# Patient Record
Sex: FEMALE | Race: OTHER | HISPANIC OR LATINO | Employment: OTHER | URBAN - METROPOLITAN AREA
[De-identification: names, ages, dates, MRNs, and addresses within clinical notes are randomized per-mention and may not be internally consistent; named-entity substitution may affect disease eponyms.]

---

## 2022-11-10 ENCOUNTER — OFFICE VISIT (OUTPATIENT)
Dept: INTERNAL MEDICINE CLINIC | Facility: CLINIC | Age: 77
End: 2022-11-10

## 2022-11-10 VITALS
HEART RATE: 63 BPM | TEMPERATURE: 98.2 F | OXYGEN SATURATION: 98 % | BODY MASS INDEX: 26 KG/M2 | WEIGHT: 129 LBS | DIASTOLIC BLOOD PRESSURE: 80 MMHG | SYSTOLIC BLOOD PRESSURE: 122 MMHG | HEIGHT: 59 IN

## 2022-11-10 DIAGNOSIS — E03.9 ACQUIRED HYPOTHYROIDISM: ICD-10-CM

## 2022-11-10 DIAGNOSIS — M12.812 ROTATOR CUFF ARTHROPATHY OF BOTH SHOULDERS: Primary | ICD-10-CM

## 2022-11-10 DIAGNOSIS — M12.811 ROTATOR CUFF ARTHROPATHY OF BOTH SHOULDERS: Primary | ICD-10-CM

## 2022-11-10 DIAGNOSIS — E78.2 MIXED HYPERLIPIDEMIA: ICD-10-CM

## 2022-11-10 DIAGNOSIS — I10 BENIGN HYPERTENSION: ICD-10-CM

## 2022-11-10 DIAGNOSIS — R41.3 MEMORY LOSS: ICD-10-CM

## 2022-11-10 RX ORDER — MELOXICAM 7.5 MG/1
7.5 TABLET ORAL DAILY
Qty: 30 TABLET | Refills: 1 | Status: SHIPPED | OUTPATIENT
Start: 2022-11-10

## 2022-11-10 RX ORDER — ATORVASTATIN CALCIUM 20 MG/1
20 TABLET, FILM COATED ORAL DAILY
COMMUNITY

## 2022-11-10 RX ORDER — IRBESARTAN 150 MG/1
150 TABLET ORAL
COMMUNITY

## 2022-11-10 RX ORDER — DICYCLOMINE HCL 20 MG
20 TABLET ORAL 2 TIMES DAILY PRN
COMMUNITY

## 2022-11-10 RX ORDER — LEVOTHYROXINE SODIUM 0.1 MG/1
100 TABLET ORAL DAILY
COMMUNITY

## 2022-11-10 RX ORDER — DONEPEZIL HYDROCHLORIDE 5 MG/1
5 TABLET, FILM COATED ORAL
COMMUNITY

## 2022-11-10 NOTE — PROGRESS NOTES
Assessment/Plan:    BMI Counseling: Body mass index is 26 05 kg/m²  The BMI is above normal  Nutrition recommendations include decreasing portion sizes, encouraging healthy choices of fruits and vegetables and decreasing fast food intake  Exercise recommendations include moderate physical activity 150 minutes/week  Rationale for BMI follow-up plan is due to patient being overweight or obese  Depression Screening and Follow-up Plan: Patient was screened for depression during today's encounter  They screened negative with a PHQ-2 score of 2             1  Rotator cuff arthropathy of both shoulders  Comments:  Exercise for shoulders discussed with her  Orders:  -     meloxicam (Mobic) 7 5 mg tablet; Take 1 tablet (7 5 mg total) by mouth daily    2  Benign hypertension    3  Mixed hyperlipidemia    4  Acquired hypothyroidism    5  Memory loss  -     Ambulatory Referral to Neurology; Future         Subjective:      Patient ID: Ezequiel Badillo is a 78 y o  female  New patient, recently moved here, family member-daughter in law with her, bilateral shoulder pain, right more than left, no trauma, memory issue as per family member, her short-term memory is good, it is a long term memory which is affected more as per family member      The following portions of the patient's history were reviewed and updated as appropriate: She  has a past medical history of Anxiety, Arthritis, Dementia ([address] ), Depression, Disease of thyroid gland, Diverticulitis of colon, Hypertension, and Urinary incontinence  She   Patient Active Problem List    Diagnosis Date Noted   • Rotator cuff arthropathy of both shoulders 11/10/2022   • Benign hypertension 11/10/2022   • Mixed hyperlipidemia 11/10/2022   • Acquired hypothyroidism 11/10/2022   • Memory loss 11/10/2022     She  has a past surgical history that includes Breast surgery; Colon surgery; and Knee surgery  Her family history includes Dementia in her mother    She  reports that she has never smoked  She has never used smokeless tobacco  She reports that she does not drink alcohol  No history on file for drug use  Current Outpatient Medications   Medication Sig Dispense Refill   • atorvastatin (LIPITOR) 20 mg tablet Take 20 mg by mouth daily     • dicyclomine (BENTYL) 20 mg tablet Take 20 mg by mouth 2 (two) times a day as needed     • donepezil (ARICEPT) 5 mg tablet Take 5 mg by mouth daily at bedtime     • irbesartan (AVAPRO) 150 mg tablet Take 150 mg by mouth daily at bedtime     • levothyroxine 100 mcg tablet Take 100 mcg by mouth daily     • meloxicam (Mobic) 7 5 mg tablet Take 1 tablet (7 5 mg total) by mouth daily 30 tablet 1   • sertraline (ZOLOFT) 50 mg tablet Take 50 mg by mouth daily (Patient not taking: Reported on 11/10/2022)       No current facility-administered medications for this visit  Current Outpatient Medications on File Prior to Visit   Medication Sig   • atorvastatin (LIPITOR) 20 mg tablet Take 20 mg by mouth daily   • dicyclomine (BENTYL) 20 mg tablet Take 20 mg by mouth 2 (two) times a day as needed   • donepezil (ARICEPT) 5 mg tablet Take 5 mg by mouth daily at bedtime   • irbesartan (AVAPRO) 150 mg tablet Take 150 mg by mouth daily at bedtime   • levothyroxine 100 mcg tablet Take 100 mcg by mouth daily   • sertraline (ZOLOFT) 50 mg tablet Take 50 mg by mouth daily (Patient not taking: Reported on 11/10/2022)     No current facility-administered medications on file prior to visit  She has No Known Allergies       Review of Systems   Constitutional: Negative for chills and fever  HENT: Negative for congestion, ear pain and sore throat  Eyes: Negative for pain  Respiratory: Negative for cough and shortness of breath  Cardiovascular: Negative for chest pain and leg swelling  Gastrointestinal: Negative for abdominal pain, nausea and vomiting  Endocrine: Negative for polyuria  Genitourinary: Negative for difficulty urinating, frequency and urgency  Musculoskeletal: Positive for arthralgias  Negative for back pain  Skin: Negative for rash  Neurological: Negative for weakness and headaches  Psychiatric/Behavioral: Negative for sleep disturbance  The patient is not nervous/anxious  Objective:      /80 (BP Location: Right arm, Patient Position: Sitting, Cuff Size: Standard)   Pulse 63   Temp 98 2 °F (36 8 °C) (Tympanic)   Ht 4' 11" (1 499 m)   Wt 58 5 kg (129 lb)   SpO2 98%   BMI 26 05 kg/m²     No results found for this or any previous visit (from the past 1344 hour(s))  Physical Exam  Constitutional:       Appearance: Normal appearance  HENT:      Head: Normocephalic  Right Ear: Tympanic membrane, ear canal and external ear normal       Left Ear: Tympanic membrane, ear canal and external ear normal       Nose: Nose normal  No congestion  Mouth/Throat:      Mouth: Mucous membranes are moist       Pharynx: Oropharynx is clear  No oropharyngeal exudate or posterior oropharyngeal erythema  Eyes:      Extraocular Movements: Extraocular movements intact  Conjunctiva/sclera: Conjunctivae normal       Pupils: Pupils are equal, round, and reactive to light  Cardiovascular:      Rate and Rhythm: Normal rate and regular rhythm  Heart sounds: Normal heart sounds  No murmur heard  Pulmonary:      Effort: Pulmonary effort is normal       Breath sounds: Normal breath sounds  No wheezing or rales  Abdominal:      General: Bowel sounds are normal  There is no distension  Palpations: Abdomen is soft  Tenderness: There is no abdominal tenderness  Musculoskeletal:      Cervical back: Normal range of motion and neck supple  Right lower leg: No edema  Left lower leg: No edema  Comments: Bilateral shoulder exam-movement painful and restricted due to pain, minimal tenderness present at the lateral side of the shoulders   Lymphadenopathy:      Cervical: No cervical adenopathy     Skin:     General: Skin is warm  Neurological:      General: No focal deficit present  Mental Status: She is alert and oriented to person, place, and time

## 2022-12-05 ENCOUNTER — TELEPHONE (OUTPATIENT)
Dept: NEUROLOGY | Facility: CLINIC | Age: 77
End: 2022-12-05

## 2022-12-19 ENCOUNTER — OFFICE VISIT (OUTPATIENT)
Dept: INTERNAL MEDICINE CLINIC | Facility: CLINIC | Age: 77
End: 2022-12-19

## 2022-12-19 VITALS
DIASTOLIC BLOOD PRESSURE: 80 MMHG | WEIGHT: 132 LBS | TEMPERATURE: 98.1 F | HEART RATE: 70 BPM | SYSTOLIC BLOOD PRESSURE: 132 MMHG | OXYGEN SATURATION: 98 % | HEIGHT: 59 IN | BODY MASS INDEX: 26.61 KG/M2

## 2022-12-19 DIAGNOSIS — E03.9 ACQUIRED HYPOTHYROIDISM: ICD-10-CM

## 2022-12-19 DIAGNOSIS — F34.1 DYSTHYMIC DISORDER: ICD-10-CM

## 2022-12-19 DIAGNOSIS — M12.812 ROTATOR CUFF ARTHROPATHY OF BOTH SHOULDERS: ICD-10-CM

## 2022-12-19 DIAGNOSIS — I10 BENIGN HYPERTENSION: ICD-10-CM

## 2022-12-19 DIAGNOSIS — M12.811 ROTATOR CUFF ARTHROPATHY OF BOTH SHOULDERS: ICD-10-CM

## 2022-12-19 DIAGNOSIS — E78.2 MIXED HYPERLIPIDEMIA: ICD-10-CM

## 2022-12-19 DIAGNOSIS — R41.3 MEMORY LOSS: Primary | ICD-10-CM

## 2022-12-19 DIAGNOSIS — Z12.11 SPECIAL SCREENING FOR MALIGNANT NEOPLASMS, COLON: ICD-10-CM

## 2022-12-19 RX ORDER — MIRTAZAPINE 15 MG/1
15 TABLET, FILM COATED ORAL
Qty: 30 TABLET | Refills: 1 | Status: SHIPPED | OUTPATIENT
Start: 2022-12-19

## 2022-12-19 NOTE — PROGRESS NOTES
Assessment/Plan:             1  Memory loss    2  Mixed hyperlipidemia    3  Benign hypertension    4  Acquired hypothyroidism    5  Rotator cuff arthropathy of both shoulders    6  Dysthymic disorder  -     mirtazapine (REMERON) 15 mg tablet; Take 1 tablet (15 mg total) by mouth daily at bedtime    7  Special screening for malignant neoplasms, colon  -     Ambulatory referral for colonoscopy; Future         Subjective:      Patient ID: Henok Lal is a 78 y o  female  Follow-up on shoulder pain, doing well, also follow-up on other medical issues to ensure stable on current medications      The following portions of the patient's history were reviewed and updated as appropriate: She  has a past medical history of Anxiety, Arthritis, Dementia ([address] ), Depression, Disease of thyroid gland, Diverticulitis of colon, Hypertension, and Urinary incontinence  She   Patient Active Problem List    Diagnosis Date Noted   • Rotator cuff arthropathy of both shoulders 11/10/2022   • Benign hypertension 11/10/2022   • Mixed hyperlipidemia 11/10/2022   • Acquired hypothyroidism 11/10/2022   • Memory loss 11/10/2022     She  has a past surgical history that includes Breast surgery; Colon surgery; and Knee surgery  Her family history includes Dementia in her mother  She  reports that she has never smoked  She has never used smokeless tobacco  She reports that she does not drink alcohol and does not use drugs    Current Outpatient Medications   Medication Sig Dispense Refill   • atorvastatin (LIPITOR) 20 mg tablet Take 20 mg by mouth daily     • dicyclomine (BENTYL) 20 mg tablet Take 20 mg by mouth 2 (two) times a day as needed     • donepezil (ARICEPT) 5 mg tablet Take 5 mg by mouth daily at bedtime     • irbesartan (AVAPRO) 150 mg tablet Take 150 mg by mouth daily at bedtime     • levothyroxine 100 mcg tablet Take 100 mcg by mouth daily     • meloxicam (Mobic) 7 5 mg tablet Take 1 tablet (7 5 mg total) by mouth daily 30 tablet 1 • mirtazapine (REMERON) 15 mg tablet Take 1 tablet (15 mg total) by mouth daily at bedtime 30 tablet 1     No current facility-administered medications for this visit  Current Outpatient Medications on File Prior to Visit   Medication Sig   • atorvastatin (LIPITOR) 20 mg tablet Take 20 mg by mouth daily   • dicyclomine (BENTYL) 20 mg tablet Take 20 mg by mouth 2 (two) times a day as needed   • donepezil (ARICEPT) 5 mg tablet Take 5 mg by mouth daily at bedtime   • irbesartan (AVAPRO) 150 mg tablet Take 150 mg by mouth daily at bedtime   • levothyroxine 100 mcg tablet Take 100 mcg by mouth daily   • meloxicam (Mobic) 7 5 mg tablet Take 1 tablet (7 5 mg total) by mouth daily   • [DISCONTINUED] sertraline (ZOLOFT) 50 mg tablet Take 50 mg by mouth daily (Patient not taking: Reported on 11/10/2022)     No current facility-administered medications on file prior to visit  She has No Known Allergies       Review of Systems   Constitutional: Negative for chills and fever  HENT: Negative for congestion, ear pain and sore throat  Eyes: Negative for pain  Respiratory: Negative for cough and shortness of breath  Cardiovascular: Negative for chest pain and leg swelling  Gastrointestinal: Negative for abdominal pain, nausea and vomiting  Endocrine: Negative for polyuria  Genitourinary: Negative for difficulty urinating, frequency and urgency  Musculoskeletal: Positive for arthralgias  Negative for back pain  Skin: Negative for rash  Neurological: Negative for weakness and headaches  Psychiatric/Behavioral: Negative for sleep disturbance  The patient is not nervous/anxious  Objective:      /80 (BP Location: Left arm, Patient Position: Sitting, Cuff Size: Large)   Pulse 70   Temp 98 1 °F (36 7 °C) (Temporal)   Ht 4' 11" (1 499 m)   Wt 59 9 kg (132 lb)   SpO2 98%   BMI 26 66 kg/m²     No results found for this or any previous visit (from the past 1344 hour(s))       Physical Exam  Constitutional:       Appearance: Normal appearance  HENT:      Head: Normocephalic  Right Ear: Tympanic membrane, ear canal and external ear normal       Left Ear: Tympanic membrane, ear canal and external ear normal       Nose: Nose normal  No congestion  Mouth/Throat:      Mouth: Mucous membranes are moist       Pharynx: Oropharynx is clear  No oropharyngeal exudate or posterior oropharyngeal erythema  Eyes:      Extraocular Movements: Extraocular movements intact  Conjunctiva/sclera: Conjunctivae normal       Pupils: Pupils are equal, round, and reactive to light  Cardiovascular:      Rate and Rhythm: Normal rate and regular rhythm  Heart sounds: Normal heart sounds  No murmur heard  Pulmonary:      Effort: Pulmonary effort is normal       Breath sounds: Normal breath sounds  No wheezing or rales  Abdominal:      General: Bowel sounds are normal  There is no distension  Palpations: Abdomen is soft  Tenderness: There is no abdominal tenderness  Musculoskeletal:         General: Normal range of motion  Cervical back: Normal range of motion and neck supple  Right lower leg: No edema  Left lower leg: No edema  Lymphadenopathy:      Cervical: No cervical adenopathy  Skin:     General: Skin is warm  Neurological:      General: No focal deficit present  Mental Status: She is alert and oriented to person, place, and time

## 2023-01-05 ENCOUNTER — TELEPHONE (OUTPATIENT)
Dept: NEUROLOGY | Facility: CLINIC | Age: 78
End: 2023-01-05

## 2023-01-05 NOTE — TELEPHONE ENCOUNTER
THE [facility] to confirm patient's 1/10/2023 at 2 pm with Dr Lorenzo Mcrae at the [facility]  Call back number given [phone]

## 2023-01-09 NOTE — PROGRESS NOTES
Assessment/Plan:    Memory loss        Moderate dementia  Adryan Harvey is a 78 yr old woman with hx of thyroid disease on levothyroxine, hld, depression, and anxiety presenting for for memory complaints  Patient has both short term and long term memory problems  Unclear timing of onset of memory problems as patient previously lived in [place] with daughter which there has been conflict between the patient and daughter  Daughter in law having difficulty finding records from [place]  Daughter in law states that memory is improving while off the sertraline and sleep has improved while on Remeron  Patient has had difficulties remembering where she was while driving in [place] despite being on familiar roads (Patient no longer drives)  Patient seems to have had issues with finances independently and has needed help from family  No previous labs and images available for reference as she has started establishing care few months ago and no records from [place] are available  Patient's aunt and mother may have had Alzheimer's disease according to daughter in law  Initial evaluation in clinic: 01/10/23    Impression: Moderate cognitive decline based on MOCA test  Differential diagnoses include but not limited to Alzheimer's disease, Lewy Body Dementia, Frontotemporal dementia, vascular dementia, pseudodementia  Most likely Alzheimer's disease and no known stroke history but vascular dementia cannot be ruled out as there is a paucity of history from when patient was back in [place]  Lewy Body Dementia less likely as no hallucinations/ Parkinsonian symptom and less likely frontotemporal dementia as behavioral issues are not significant in terms of issues  Also component of pseudodementia due to depression and anxiety but unlikely explain her complete clinical picture      Plan:  - Differential diagnoses and next steps reviewed with the patient  - Imaging recommendations: No imaging recommended at this time and MRI brain Neuroquant  - Labs: TSH, B12/folate  - Referrals: Neuropsych   - Increased Donepezil to 10mg nightly due to moderate dementia  - Patient does not drive  Patient to followup in 6 months for memory evaluation  - Patient and daughter in law agreeable to above           Diagnoses and all orders for this visit:    Memory loss  -     Ambulatory Referral to Neurology  -     MRI brain NeuroQuant wo and w contrast; Future  -     Ambulatory referral to Neuropsychology; Future  -     Vitamin B12/Folate, Serum Panel; Future  -     TSH, 3rd generation with Free T4 reflex; Future  -     donepezil (ARICEPT) 10 mg tablet; Take 1 tablet (10 mg total) by mouth daily at bedtime  -     Basic metabolic panel; Future    Moderate dementia          Subjective:      Patient ID: Adryan Harvey is a 78 y o  female  Patient is a 78year old woman with hx of thyroid disease on levothyroxine, hld, depression and anxiety  Memory issues unclear when first noticed  More long term memory rather than short term memory  Patient when cannot remember she starts hitting her head due to being in distress  She has times she forgot her coat  She forgets keep things secret and then when she was told that, she becomes distressed  Memory problem first noticed by patient but she is unsure when memory problems started  Daughter in law suspects possibly in a couple years  Memory loss seems gradual but there is paucity of information due to living with daughter in the past  Patient has difficulty with memory from the past like where she lived in the past and her family information from the past such as dates and when she moved  Memory has been improving according to daughter in law especially after primary doctor removed the sertraline 50mg off of the medication list  Patient is fluent in speech with no word finding difficulties  She does not drive but when she was in [place] she drove but had times when she was on a familiar road she did not know where she is  She does have one time when she did not where she was and in [place] she did have 1-2 times when she was lost in her house  She had a time when she gave personal information to an unknown person online but she then realized what she did and then become emotionally distressed but unsure if patient makes stories up as well  Family assists for her financial capabilities, because she has episodes that seems like patient is confabulating for attention  According to daughter in law, patient does have emotional instability due to missing friends and family from [place] and is now in a new location  She doesn't need a walker or cane and have had no falls  No confusion, no seizures, no staring spells  Patient sleeping well after placing patient on remeron for sleep  Patient has polyuria which has been controlled by her medicine doctor and can control when she needs to urinate  Denies hallucinations  Mother and aunt may have had Alzheimer's in the past     Also states having shaking of both hands when she's anxious or when talking to her daughter and does not have these shaking otherwise  No pacing and no agitated behavior  No suspicious behaviors, feeling withdrawn  She has some numbness of the hands bilaterally intermittently which are occasional and no weakness  No vision problems  Patient says she has pain on back of the head which resolves with massaging of the head  The following portions of the patient's history were reviewed and updated as appropriate:   She  has a past medical history of Anxiety, Arthritis, Dementia ([address] ), Depression, Disease of thyroid gland, Diverticulitis of colon, Hypertension, and Urinary incontinence    She   Patient Active Problem List    Diagnosis Date Noted   • Moderate dementia 01/10/2023   • Rotator cuff arthropathy of both shoulders 11/10/2022   • Benign hypertension 11/10/2022   • Mixed hyperlipidemia 11/10/2022   • Acquired hypothyroidism 11/10/2022   • Memory loss 11/10/2022     She  has a past surgical history that includes Breast surgery; Colon surgery; and Knee surgery  Her family history includes Dementia in her mother  She  reports that she has never smoked  She has never used smokeless tobacco  She reports that she does not drink alcohol and does not use drugs  Current Outpatient Medications   Medication Sig Dispense Refill   • atorvastatin (LIPITOR) 20 mg tablet Take 20 mg by mouth daily     • dicyclomine (BENTYL) 20 mg tablet Take 20 mg by mouth 2 (two) times a day as needed     • donepezil (ARICEPT) 10 mg tablet Take 1 tablet (10 mg total) by mouth daily at bedtime 90 tablet 1   • irbesartan (AVAPRO) 150 mg tablet Take 150 mg by mouth daily at bedtime     • levothyroxine 100 mcg tablet Take 100 mcg by mouth daily     • meloxicam (Mobic) 7 5 mg tablet Take 1 tablet (7 5 mg total) by mouth daily 30 tablet 1   • mirtazapine (REMERON) 15 mg tablet Take 1 tablet (15 mg total) by mouth daily at bedtime 30 tablet 1     No current facility-administered medications for this visit  Current Outpatient Medications on File Prior to Visit   Medication Sig   • atorvastatin (LIPITOR) 20 mg tablet Take 20 mg by mouth daily   • dicyclomine (BENTYL) 20 mg tablet Take 20 mg by mouth 2 (two) times a day as needed   • irbesartan (AVAPRO) 150 mg tablet Take 150 mg by mouth daily at bedtime   • levothyroxine 100 mcg tablet Take 100 mcg by mouth daily   • meloxicam (Mobic) 7 5 mg tablet Take 1 tablet (7 5 mg total) by mouth daily   • mirtazapine (REMERON) 15 mg tablet Take 1 tablet (15 mg total) by mouth daily at bedtime   • [DISCONTINUED] donepezil (ARICEPT) 5 mg tablet Take 5 mg by mouth daily at bedtime     No current facility-administered medications on file prior to visit  She has No Known Allergies       Review of Systems   Constitutional: Negative for fatigue and fever  HENT: Negative for sore throat, trouble swallowing and voice change      Eyes: Negative for photophobia, pain and visual disturbance  Respiratory: Negative for chest tightness and shortness of breath  Cardiovascular: Negative for chest pain  Gastrointestinal: Positive for diarrhea  Negative for constipation  Endocrine: Positive for polyuria  Genitourinary: Negative for difficulty urinating  Musculoskeletal: Negative for back pain and gait problem  Neurological: Negative for speech difficulty, weakness and numbness  Objective:      /80 (BP Location: Right arm, Patient Position: Sitting, Cuff Size: Large)   Pulse 74   Ht 4' 11" (1 499 m)   Wt 61 kg (134 lb 6 4 oz)   BMI 27 15 kg/m²          Physical Exam  Eyes:      Extraocular Movements: EOM normal       Pupils: Pupils are equal, round, and reactive to light  Neurological:      Motor: Motor strength is normal       Coordination: Finger-Nose-Finger Test and Heel to [facility] Test normal       Deep Tendon Reflexes:      Reflex Scores:       Bicep reflexes are 2+ on the right side and 2+ on the left side  Brachioradialis reflexes are 2+ on the right side and 2+ on the left side  Patellar reflexes are 2+ on the right side and 2+ on the left side  Achilles reflexes are 2+ on the right side and 2+ on the left side  Psychiatric:         Speech: Speech normal          Neurologic Exam     Mental Status   Oriented to person  Disoriented to place  Disoriented to date  Oriented to year and month  Attention: decreased  Concentration: decreased  Speech: speech is normal   Level of consciousness: alert  Knowledge: good  [facility] 11/[facility] with particular deficits in recall (0/5), visuospatial, and language  Cranial Nerves     CN II   Visual fields full to confrontation  CN III, IV, VI   Pupils are equal, round, and reactive to light  Extraocular motions are normal    Right pupil: Shape: regular  Reactivity: brisk  Consensual response: intact  Left pupil: Shape: regular  Reactivity: brisk  Consensual response: intact  Nystagmus: none   Diplopia: none    CN V   Facial sensation intact  CN VII   Facial expression full, symmetric  CN IX, X   CN IX normal    CN X normal      CN XI   CN XI normal      Motor Exam   Muscle bulk: normal  Overall muscle tone: normal  Right arm tone: normal  Left arm tone: normal  Right arm pronator drift: absent  Right leg tone: normal  Left leg tone: normal    Strength   Strength 5/5 throughout       Sensory Exam   Light touch normal      Gait, Coordination, and Reflexes     Gait  Gait: wide-based    Coordination   Finger to nose coordination: normal  Heel to shin coordination: normal    Reflexes   Right brachioradialis: 2+  Left brachioradialis: 2+  Right biceps: 2+  Left biceps: 2+  Right patellar: 2+  Left patellar: 2+  Right achilles: 2+  Left achilles: 2+  Right plantar: normal  Left plantar: normal

## 2023-01-10 ENCOUNTER — OFFICE VISIT (OUTPATIENT)
Dept: NEUROLOGY | Facility: CLINIC | Age: 78
End: 2023-01-10

## 2023-01-10 VITALS
WEIGHT: 134.4 LBS | DIASTOLIC BLOOD PRESSURE: 80 MMHG | SYSTOLIC BLOOD PRESSURE: 118 MMHG | HEART RATE: 74 BPM | HEIGHT: 59 IN | BODY MASS INDEX: 27.09 KG/M2

## 2023-01-10 DIAGNOSIS — F03.B0: ICD-10-CM

## 2023-01-10 DIAGNOSIS — R41.3 MEMORY LOSS: Primary | ICD-10-CM

## 2023-01-10 RX ORDER — DONEPEZIL HYDROCHLORIDE 10 MG/1
10 TABLET, FILM COATED ORAL
Qty: 90 TABLET | Refills: 1 | Status: SHIPPED | OUTPATIENT
Start: 2023-01-10 | End: 2023-01-16 | Stop reason: SDUPTHER

## 2023-01-10 NOTE — PATIENT INSTRUCTIONS
Please get MRI brain before next visit    Please get bloodwork before next visit    Please go for Neuropsychology visit for further memory tests    Increase donepezil to 10mg daily    Followup in 6 months

## 2023-01-10 NOTE — ASSESSMENT & PLAN NOTE
Serena Zarate is a 78 yr old woman with hx of thyroid disease on levothyroxine, hld, depression, and anxiety presenting for for memory complaints  Patient has both short term and long term memory problems  Unclear timing of onset of memory problems as patient previously lived in [place] with daughter which there has been conflict between the patient and daughter  Daughter in law having difficulty finding records from [place]  Daughter in law states that memory is improving while off the sertraline and sleep has improved while on Remeron  Patient has had difficulties remembering where she was while driving in [place] despite being on familiar roads (Patient no longer drives)  Patient seems to have had issues with finances independently and has needed help from family  No previous labs and images available for reference as she has started establishing care few months ago and no records from [place] are available  Patient's aunt and mother may have had Alzheimer's disease according to daughter in law  Initial evaluation in clinic: 01/10/23    Impression: Moderate cognitive decline based on MOCA test  Differential diagnoses include but not limited to Alzheimer's disease, Lewy Body Dementia, Frontotemporal dementia, vascular dementia, pseudodementia  Most likely Alzheimer's disease and no known stroke history but vascular dementia cannot be ruled out as there is a paucity of history from when patient was back in [place]  Lewy Body Dementia less likely as no hallucinations/ Parkinsonian symptom and less likely frontotemporal dementia as behavioral issues are not significant in terms of issues  Also component of pseudodementia due to depression and anxiety but unlikely explain her complete clinical picture      Plan:  - Differential diagnoses and next steps reviewed with the patient  - Imaging recommendations: No imaging recommended at this time and MRI brain Neuroquant  - Labs: TSH, B12/folate  - Referrals: Neuropsych - Increased Donepezil to 10mg nightly due to moderate dementia  - Patient does not drive  Patient to followup in 6 months for memory evaluation  - Patient and daughter in law agreeable to above

## 2023-01-16 ENCOUNTER — OFFICE VISIT (OUTPATIENT)
Dept: INTERNAL MEDICINE CLINIC | Facility: CLINIC | Age: 78
End: 2023-01-16

## 2023-01-16 ENCOUNTER — APPOINTMENT (OUTPATIENT)
Dept: LAB | Facility: CLINIC | Age: 78
End: 2023-01-16

## 2023-01-16 VITALS
OXYGEN SATURATION: 98 % | HEART RATE: 58 BPM | WEIGHT: 128 LBS | SYSTOLIC BLOOD PRESSURE: 134 MMHG | BODY MASS INDEX: 25.8 KG/M2 | TEMPERATURE: 97.8 F | HEIGHT: 59 IN | DIASTOLIC BLOOD PRESSURE: 78 MMHG

## 2023-01-16 DIAGNOSIS — M12.811 ROTATOR CUFF ARTHROPATHY OF BOTH SHOULDERS: ICD-10-CM

## 2023-01-16 DIAGNOSIS — M12.812 ROTATOR CUFF ARTHROPATHY OF BOTH SHOULDERS: ICD-10-CM

## 2023-01-16 DIAGNOSIS — E78.2 MIXED HYPERLIPIDEMIA: Primary | ICD-10-CM

## 2023-01-16 DIAGNOSIS — R41.3 MEMORY LOSS: ICD-10-CM

## 2023-01-16 DIAGNOSIS — F02.A0 MILD EARLY ONSET ALZHEIMER'S DEMENTIA WITHOUT BEHAVIORAL DISTURBANCE, PSYCHOTIC DISTURBANCE, MOOD DISTURBANCE, OR ANXIETY (HCC): ICD-10-CM

## 2023-01-16 DIAGNOSIS — Z00.00 MEDICARE ANNUAL WELLNESS VISIT, SUBSEQUENT: ICD-10-CM

## 2023-01-16 DIAGNOSIS — G30.0 MILD EARLY ONSET ALZHEIMER'S DEMENTIA WITHOUT BEHAVIORAL DISTURBANCE, PSYCHOTIC DISTURBANCE, MOOD DISTURBANCE, OR ANXIETY (HCC): ICD-10-CM

## 2023-01-16 DIAGNOSIS — F34.1 DYSTHYMIC DISORDER: ICD-10-CM

## 2023-01-16 DIAGNOSIS — E03.9 ACQUIRED HYPOTHYROIDISM: ICD-10-CM

## 2023-01-16 DIAGNOSIS — I10 BENIGN HYPERTENSION: ICD-10-CM

## 2023-01-16 LAB
ANION GAP SERPL CALCULATED.3IONS-SCNC: 2 MMOL/L (ref 4–13)
BUN SERPL-MCNC: 24 MG/DL (ref 5–25)
CALCIUM SERPL-MCNC: 8.7 MG/DL (ref 8.3–10.1)
CHLORIDE SERPL-SCNC: 107 MMOL/L (ref 96–108)
CO2 SERPL-SCNC: 32 MMOL/L (ref 21–32)
CREAT SERPL-MCNC: 0.96 MG/DL (ref 0.6–1.3)
FOLATE SERPL-MCNC: 16.2 NG/ML (ref 3.1–17.5)
GFR SERPL CREATININE-BSD FRML MDRD: 58 ML/MIN/1.73SQ M
GLUCOSE P FAST SERPL-MCNC: 75 MG/DL (ref 65–99)
POTASSIUM SERPL-SCNC: 4.3 MMOL/L (ref 3.5–5.3)
SODIUM SERPL-SCNC: 141 MMOL/L (ref 135–147)
T4 FREE SERPL-MCNC: 0.8 NG/DL (ref 0.76–1.46)
TSH SERPL DL<=0.05 MIU/L-ACNC: 11 UIU/ML (ref 0.45–4.5)
VIT B12 SERPL-MCNC: 462 PG/ML (ref 100–900)

## 2023-01-16 RX ORDER — LEVOTHYROXINE SODIUM 0.1 MG/1
100 TABLET ORAL DAILY
Qty: 180 TABLET | Refills: 0 | Status: SHIPPED | OUTPATIENT
Start: 2023-01-16 | End: 2023-01-19 | Stop reason: SDUPTHER

## 2023-01-16 RX ORDER — MELOXICAM 7.5 MG/1
7.5 TABLET ORAL DAILY
Qty: 180 TABLET | Refills: 0 | Status: SHIPPED | OUTPATIENT
Start: 2023-01-16 | End: 2023-01-19 | Stop reason: SDUPTHER

## 2023-01-16 RX ORDER — ATORVASTATIN CALCIUM 20 MG/1
20 TABLET, FILM COATED ORAL DAILY
Qty: 180 TABLET | Refills: 0 | Status: SHIPPED | OUTPATIENT
Start: 2023-01-16 | End: 2023-01-19 | Stop reason: SDUPTHER

## 2023-01-16 RX ORDER — MIRTAZAPINE 15 MG/1
15 TABLET, FILM COATED ORAL
Qty: 180 TABLET | Refills: 0 | Status: SHIPPED | OUTPATIENT
Start: 2023-01-16 | End: 2023-01-19 | Stop reason: SDUPTHER

## 2023-01-16 RX ORDER — DONEPEZIL HYDROCHLORIDE 10 MG/1
10 TABLET, FILM COATED ORAL
Qty: 180 TABLET | Refills: 0 | Status: SHIPPED | OUTPATIENT
Start: 2023-01-16 | End: 2023-01-19 | Stop reason: SDUPTHER

## 2023-01-16 RX ORDER — IRBESARTAN 150 MG/1
150 TABLET ORAL
Qty: 180 TABLET | Refills: 0 | Status: SHIPPED | OUTPATIENT
Start: 2023-01-16 | End: 2023-01-19 | Stop reason: SDUPTHER

## 2023-01-16 NOTE — PROGRESS NOTES
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, psychotic disturbance, mood disturbance, or anxiety (HCC)    Relevant Medications    mirtazapine (REMERON) 15 mg tablet    donepezil (ARICEPT) 10 mg tablet       Musculoskeletal and Integument    Rotator cuff arthropathy of both shoulders    Relevant Medications    meloxicam (Mobic) 7 5 mg tablet       Other    Mixed hyperlipidemia - Primary    Relevant Medications    atorvastatin (LIPITOR) 20 mg tablet    Memory loss    Medicare annual wellness visit, subsequent    Dysthymic disorder    Relevant Medications    mirtazapine (REMERON) 15 mg tablet    donepezil (ARICEPT) 10 mg tablet     BMI Counseling: Body mass index is 25 85 kg/m²  The BMI is above normal  Nutrition recommendations include decreasing portion sizes, encouraging healthy choices of fruits and vegetables and decreasing fast food intake  Exercise recommendations include moderate physical activity 150 minutes/week  Rationale for BMI follow-up plan is due to patient being overweight or obese  Preventive health issues were discussed with patient, and age appropriate screening tests were ordered as noted in patient's After Visit Summary  Personalized health advice and appropriate referrals for health education or preventive services given if needed, as noted in patient's After Visit Summary       History of Present Illness:     Patient presents for a Medicare Wellness Visit    Follow-up on multiple medical problems to ensure they are stable on current medication, also Medicare wellness exam     No care team member to display     Review of Systems:     Review of Systems Constitutional: Negative for chills and fever  HENT: Negative for congestion, ear pain and sore throat  Eyes: Negative for pain  Respiratory: Negative for cough and shortness of breath  Cardiovascular: Negative for chest pain and leg swelling  Gastrointestinal: Negative for abdominal pain, nausea and vomiting  Endocrine: Negative for polyuria  Genitourinary: Negative for difficulty urinating, frequency and urgency  Musculoskeletal: Positive for back pain  Negative for arthralgias  Skin: Negative for rash  Neurological: Negative for weakness and headaches  Psychiatric/Behavioral: Negative for sleep disturbance  The patient is not nervous/anxious  Problem List:     Patient Active Problem List   Diagnosis   • Rotator cuff arthropathy of both shoulders   • Benign hypertension   • Mixed hyperlipidemia   • Acquired hypothyroidism   • Memory loss   • Moderate dementia   • Medicare annual wellness visit, subsequent   • Dysthymic disorder   • 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, psychotic disturbance, mood disturbance, or anxiety ([facility] )      Past Medical and Surgical History:     Past Medical History:   Diagnosis Date   • Anxiety    • Arthritis    • Dementia ([facility] )    • Depression    • Disease of thyroid gland    • Diverticulitis of colon    • Hypertension    • Urinary incontinence      Past Surgical History:   Procedure Laterality Date   • BREAST SURGERY     • COLON SURGERY     • KNEE SURGERY        Family History:     Family History   Problem Relation Age of Onset   • Dementia Mother       Social History:     Social History     Socioeconomic History   • Marital status:       Spouse name: None   • Number of children: None   • Years of education: None   • Highest education level: None   Occupational History   • None   Tobacco Use   • Smoking status: Never   • Smokeless tobacco: Never   Vaping Use   • Vaping Use: Never used   Substance and Sexual Activity • Alcohol use: Never   • Drug use: Never   • Sexual activity: Not Currently   Other Topics Concern   • None   Social History Narrative   • None     Social Determinants of Health     Financial Resource Strain: Low Risk    • Difficulty of Paying Living Expenses: Not hard at all   Food Insecurity: Not on file   Transportation Needs: No Transportation Needs   • Lack of Transportation (Medical): No   • Lack of Transportation (Non-Medical): No   Physical Activity: Not on file   Stress: Not on file   Social Connections: Not on file   Intimate Partner Violence: Not on file   Housing Stability: Not on file      Medications and Allergies:     Current Outpatient Medications   Medication Sig Dispense Refill   • atorvastatin (LIPITOR) 20 mg tablet Take 1 tablet (20 mg total) by mouth daily 180 tablet 0   • dicyclomine (BENTYL) 20 mg tablet Take 20 mg by mouth 2 (two) times a day as needed     • donepezil (ARICEPT) 10 mg tablet Take 1 tablet (10 mg total) by mouth daily at bedtime 180 tablet 0   • irbesartan (AVAPRO) 150 mg tablet Take 1 tablet (150 mg total) by mouth daily at bedtime 180 tablet 0   • levothyroxine 100 mcg tablet Take 1 tablet (100 mcg total) by mouth daily 180 tablet 0   • meloxicam (Mobic) 7 5 mg tablet Take 1 tablet (7 5 mg total) by mouth daily 180 tablet 0   • mirtazapine (REMERON) 15 mg tablet Take 1 tablet (15 mg total) by mouth daily at bedtime 180 tablet 0     No current facility-administered medications for this visit  No Known Allergies   Immunizations: There is no immunization history on file for this patient  Health Maintenance:         Topic Date Due   • Hepatitis C Screening  Never done   • Colorectal Cancer Screening  Never done         Topic Date Due   • COVID-19 Vaccine (1) Never done   • Pneumococcal Vaccine: 65+ Years (1 - PCV) Never done   • Influenza Vaccine (1) Never done      Medicare Screening Tests and Risk Assessments:     Tiara Montes De Oca is here for her Subsequent Wellness visit  Health Risk Assessment:   Patient rates overall health as good  Patient feels that their physical health rating is same  Patient is satisfied with their life  Eyesight was rated as same  Hearing was rated as same  Patient feels that their emotional and mental health rating is much better  Patients states they are never, rarely angry  Patient states they are sometimes unusually tired/fatigued  Pain experienced in the last 7 days has been none  Patient states that she has experienced no weight loss or gain in last 6 months  Depression Screening:   PHQ-2 Score: 0      Fall Risk Screening: In the past year, patient has experienced: no history of falling in past year      Urinary Incontinence Screening:   Patient has not leaked urine accidently in the last six months  Home Safety:  Patient does not have trouble with stairs inside or outside of their home  Patient has working smoke alarms and has working carbon monoxide detector  Home safety hazards include: none  Nutrition:   Current diet is Regular  Medications:   Patient is not currently taking any over-the-counter supplements  Patient is not able to manage medications  Activities of Daily Living (ADLs)/Instrumental Activities of Daily Living (IADLs):   Walk and transfer into and out of bed and chair?: Yes  Dress and groom yourself?: Yes    Bathe or shower yourself?: Yes    Feed yourself? Yes  Do your laundry/housekeeping?: No  Manage your money, pay your bills and track your expenses?: No  Make your own meals?: No    Do your own shopping?: Yes    Previous Hospitalizations:   Any hospitalizations or ED visits within the last 12 months?: No      Advance Care Planning:   Living will: No    Durable POA for healthcare:  Yes    Advanced directive: No    Five wishes given: Yes      PREVENTIVE SCREENINGS      Cardiovascular Screening:    General: Screening Not Indicated and History Lipid Disorder      Cervical Cancer Screening:    General: Screening Not Indicated      Lung Cancer Screening:     General: Screening Not Indicated    Screening, Brief Intervention, and Referral to Treatment (SBIRT)    Screening  Typical number of drinks in a day: 0  Typical number of drinks in a week: 0  Interpretation: Low risk drinking behavior  Single Item Drug Screening:  How often have you used an illegal drug (including marijuana) or a prescription medication for non-medical reasons in the past year? never    Single Item Drug Screen Score: 0  Interpretation: Negative screen for possible drug use disorder    No results found  Physical Exam:     /78 (BP Location: Left arm, Patient Position: Sitting, Cuff Size: Standard)   Pulse 58   Temp 97 8 °F (36 6 °C) (Temporal)   Ht 4' 11" (1 499 m)   Wt 58 1 kg (128 lb)   SpO2 98%   BMI 25 85 kg/m²     Physical Exam  Vitals and nursing note reviewed  Constitutional:       General: She is not in acute distress  Appearance: She is well-developed  HENT:      Head: Normocephalic and atraumatic  Right Ear: Tympanic membrane, ear canal and external ear normal       Left Ear: Tympanic membrane, ear canal and external ear normal       Mouth/Throat:      Pharynx: Oropharynx is clear  Eyes:      Extraocular Movements: Extraocular movements intact  Conjunctiva/sclera: Conjunctivae normal    Cardiovascular:      Rate and Rhythm: Normal rate and regular rhythm  Heart sounds: Normal heart sounds  No murmur heard  Pulmonary:      Effort: Pulmonary effort is normal  No respiratory distress  Breath sounds: Normal breath sounds  Abdominal:      General: Abdomen is flat  There is no distension  Palpations: Abdomen is soft  Tenderness: There is no abdominal tenderness  Musculoskeletal:         General: No swelling  Cervical back: Normal range of motion and neck supple  Right lower leg: No edema  Left lower leg: No edema  Skin:     General: Skin is warm and dry        Capillary Refill: Capillary refill takes less than 2 seconds  Neurological:      General: No focal deficit present  Mental Status: She is alert     Psychiatric:         Mood and Affect: Mood normal           Eloy Sheikh MD

## 2023-01-17 ENCOUNTER — TELEPHONE (OUTPATIENT)
Dept: NEUROLOGY | Facility: CLINIC | Age: 78
End: 2023-01-17

## 2023-01-17 NOTE — TELEPHONE ENCOUNTER
Updated daughter in law about the blood work (Daughter in law is a caretaker of patient)  All blood work are unconcerning except the elevated TSH of 11 0  Advised it would be best to discuss with PCP Dr Isaac Abdalla concerning this test  Will also be giving Dr Isaac Abdalla message about the test result as well about it as well(any needed adjustments in levothyroxine?)    Daughter in law agreeable

## 2023-01-19 DIAGNOSIS — F34.1 DYSTHYMIC DISORDER: ICD-10-CM

## 2023-01-19 DIAGNOSIS — E78.2 MIXED HYPERLIPIDEMIA: ICD-10-CM

## 2023-01-19 DIAGNOSIS — R10.84 GENERALIZED ABDOMINAL PAIN: Primary | ICD-10-CM

## 2023-01-19 DIAGNOSIS — F02.A0 MILD EARLY ONSET ALZHEIMER'S DEMENTIA WITHOUT BEHAVIORAL DISTURBANCE, PSYCHOTIC DISTURBANCE, MOOD DISTURBANCE, OR ANXIETY (HCC): ICD-10-CM

## 2023-01-19 DIAGNOSIS — M12.811 ROTATOR CUFF ARTHROPATHY OF BOTH SHOULDERS: ICD-10-CM

## 2023-01-19 DIAGNOSIS — I10 BENIGN HYPERTENSION: ICD-10-CM

## 2023-01-19 DIAGNOSIS — M12.812 ROTATOR CUFF ARTHROPATHY OF BOTH SHOULDERS: ICD-10-CM

## 2023-01-19 DIAGNOSIS — E03.9 ACQUIRED HYPOTHYROIDISM: ICD-10-CM

## 2023-01-19 DIAGNOSIS — G30.0 MILD EARLY ONSET ALZHEIMER'S DEMENTIA WITHOUT BEHAVIORAL DISTURBANCE, PSYCHOTIC DISTURBANCE, MOOD DISTURBANCE, OR ANXIETY (HCC): ICD-10-CM

## 2023-01-19 NOTE — TELEPHONE ENCOUNTER
[facility] please send 6 months supply of medications to [facility] as [facility] is having system issues  Patient is going on vacation and needs to  6 months supply of medication

## 2023-01-20 RX ORDER — DONEPEZIL HYDROCHLORIDE 10 MG/1
10 TABLET, FILM COATED ORAL
Qty: 180 TABLET | Refills: 0 | Status: SHIPPED | OUTPATIENT
Start: 2023-01-20

## 2023-01-20 RX ORDER — MELOXICAM 7.5 MG/1
7.5 TABLET ORAL DAILY
Qty: 180 TABLET | Refills: 0 | Status: SHIPPED | OUTPATIENT
Start: 2023-01-20

## 2023-01-20 RX ORDER — MIRTAZAPINE 15 MG/1
15 TABLET, FILM COATED ORAL
Qty: 180 TABLET | Refills: 0 | Status: SHIPPED | OUTPATIENT
Start: 2023-01-20

## 2023-01-20 RX ORDER — IRBESARTAN 150 MG/1
150 TABLET ORAL
Qty: 180 TABLET | Refills: 0 | Status: SHIPPED | OUTPATIENT
Start: 2023-01-20

## 2023-01-20 RX ORDER — ATORVASTATIN CALCIUM 20 MG/1
20 TABLET, FILM COATED ORAL DAILY
Qty: 180 TABLET | Refills: 0 | Status: SHIPPED | OUTPATIENT
Start: 2023-01-20

## 2023-01-20 RX ORDER — DICYCLOMINE HCL 20 MG
20 TABLET ORAL 2 TIMES DAILY PRN
Qty: 20 TABLET | Refills: 0 | Status: SHIPPED | OUTPATIENT
Start: 2023-01-20

## 2023-01-20 RX ORDER — LEVOTHYROXINE SODIUM 0.1 MG/1
100 TABLET ORAL DAILY
Qty: 180 TABLET | Refills: 0 | Status: SHIPPED | OUTPATIENT
Start: 2023-01-20

## 2023-01-25 ENCOUNTER — TELEPHONE (OUTPATIENT)
Dept: PSYCHIATRY | Facility: CLINIC | Age: 78
End: 2023-01-25

## 2023-02-01 ENCOUNTER — TELEPHONE (OUTPATIENT)
Dept: INTERNAL MEDICINE CLINIC | Facility: CLINIC | Age: 78
End: 2023-02-01

## 2023-02-01 DIAGNOSIS — E03.9 ACQUIRED HYPOTHYROIDISM: Primary | ICD-10-CM

## 2023-02-01 RX ORDER — LEVOTHYROXINE SODIUM 112 UG/1
112 TABLET ORAL
Qty: 90 TABLET | Refills: 3 | Status: SHIPPED | OUTPATIENT
Start: 2023-02-01

## 2023-02-01 NOTE — TELEPHONE ENCOUNTER
Patient daughter called about the adjustment of patient's thyroid medication  Please call her after 5:00 pm today when she gets out of work

## 2023-03-17 PROBLEM — Z00.00 MEDICARE ANNUAL WELLNESS VISIT, SUBSEQUENT: Status: RESOLVED | Noted: 2023-01-16 | Resolved: 2023-03-17

## 2023-06-15 DIAGNOSIS — R10.84 GENERALIZED ABDOMINAL PAIN: ICD-10-CM

## 2023-06-15 RX ORDER — DICYCLOMINE HCL 20 MG
20 TABLET ORAL 2 TIMES DAILY PRN
Qty: 20 TABLET | Refills: 0 | Status: SHIPPED | OUTPATIENT
Start: 2023-06-15

## 2023-07-15 ENCOUNTER — APPOINTMENT (OUTPATIENT)
Dept: LAB | Facility: CLINIC | Age: 78
End: 2023-07-15
Payer: COMMERCIAL

## 2023-07-15 DIAGNOSIS — E03.9 ACQUIRED HYPOTHYROIDISM: ICD-10-CM

## 2023-07-15 LAB — TSH SERPL DL<=0.05 MIU/L-ACNC: 5.72 UIU/ML (ref 0.45–4.5)

## 2023-07-15 PROCEDURE — 36415 COLL VENOUS BLD VENIPUNCTURE: CPT

## 2023-07-15 PROCEDURE — 84443 ASSAY THYROID STIM HORMONE: CPT

## 2023-07-17 ENCOUNTER — OFFICE VISIT (OUTPATIENT)
Dept: INTERNAL MEDICINE CLINIC | Facility: CLINIC | Age: 78
End: 2023-07-17
Payer: COMMERCIAL

## 2023-07-17 VITALS
TEMPERATURE: 97.6 F | SYSTOLIC BLOOD PRESSURE: 148 MMHG | BODY MASS INDEX: 26.21 KG/M2 | DIASTOLIC BLOOD PRESSURE: 86 MMHG | HEART RATE: 56 BPM | OXYGEN SATURATION: 99 % | HEIGHT: 59 IN | WEIGHT: 130 LBS

## 2023-07-17 DIAGNOSIS — A09 DIARRHEA OF INFECTIOUS ORIGIN: ICD-10-CM

## 2023-07-17 DIAGNOSIS — R10.84 GENERALIZED ABDOMINAL PAIN: ICD-10-CM

## 2023-07-17 DIAGNOSIS — F33.9 DEPRESSION, RECURRENT (HCC): ICD-10-CM

## 2023-07-17 DIAGNOSIS — E78.2 MIXED HYPERLIPIDEMIA: ICD-10-CM

## 2023-07-17 DIAGNOSIS — E03.9 ACQUIRED HYPOTHYROIDISM: ICD-10-CM

## 2023-07-17 DIAGNOSIS — Z13.820 SCREENING FOR OSTEOPOROSIS: ICD-10-CM

## 2023-07-17 DIAGNOSIS — I10 BENIGN HYPERTENSION: Primary | ICD-10-CM

## 2023-07-17 DIAGNOSIS — F34.1 DYSTHYMIC DISORDER: ICD-10-CM

## 2023-07-17 DIAGNOSIS — F02.A0 MILD EARLY ONSET ALZHEIMER'S DEMENTIA WITHOUT BEHAVIORAL DISTURBANCE, PSYCHOTIC DISTURBANCE, MOOD DISTURBANCE, OR ANXIETY (HCC): ICD-10-CM

## 2023-07-17 DIAGNOSIS — G30.0 MILD EARLY ONSET ALZHEIMER'S DEMENTIA WITHOUT BEHAVIORAL DISTURBANCE, PSYCHOTIC DISTURBANCE, MOOD DISTURBANCE, OR ANXIETY (HCC): ICD-10-CM

## 2023-07-17 PROCEDURE — 99214 OFFICE O/P EST MOD 30 MIN: CPT | Performed by: INTERNAL MEDICINE

## 2023-07-17 RX ORDER — MIRTAZAPINE 15 MG/1
15 TABLET, FILM COATED ORAL
Qty: 180 TABLET | Refills: 0 | Status: SHIPPED | OUTPATIENT
Start: 2023-07-17

## 2023-07-17 RX ORDER — DICYCLOMINE HCL 20 MG
20 TABLET ORAL 2 TIMES DAILY PRN
Qty: 20 TABLET | Refills: 0 | Status: SHIPPED | OUTPATIENT
Start: 2023-07-17

## 2023-07-17 RX ORDER — LEVOTHYROXINE SODIUM 112 UG/1
112 TABLET ORAL
Qty: 90 TABLET | Refills: 3 | Status: SHIPPED | OUTPATIENT
Start: 2023-07-17

## 2023-07-17 RX ORDER — ATORVASTATIN CALCIUM 20 MG/1
20 TABLET, FILM COATED ORAL DAILY
Qty: 180 TABLET | Refills: 0 | Status: SHIPPED | OUTPATIENT
Start: 2023-07-17

## 2023-07-17 RX ORDER — DONEPEZIL HYDROCHLORIDE 10 MG/1
10 TABLET, FILM COATED ORAL
Qty: 180 TABLET | Refills: 0 | Status: SHIPPED | OUTPATIENT
Start: 2023-07-17

## 2023-07-17 RX ORDER — METRONIDAZOLE 500 MG/1
500 TABLET ORAL EVERY 12 HOURS SCHEDULED
Qty: 14 TABLET | Refills: 0 | Status: SHIPPED | OUTPATIENT
Start: 2023-07-17 | End: 2023-07-24

## 2023-07-17 RX ORDER — IRBESARTAN 150 MG/1
150 TABLET ORAL
Qty: 180 TABLET | Refills: 0 | Status: SHIPPED | OUTPATIENT
Start: 2023-07-17

## 2023-07-17 NOTE — PROGRESS NOTES
Assessment/Plan:             1. Benign hypertension  -     irbesartan (AVAPRO) 150 mg tablet; Take 1 tablet (150 mg total) by mouth daily at bedtime    2. Dysthymic disorder  -     mirtazapine (REMERON) 15 mg tablet; Take 1 tablet (15 mg total) by mouth daily at bedtime    3. Generalized abdominal pain  -     dicyclomine (BENTYL) 20 mg tablet; Take 1 tablet (20 mg total) by mouth 2 (two) times a day as needed (abd pain)    4. Mixed hyperlipidemia  -     atorvastatin (LIPITOR) 20 mg tablet; Take 1 tablet (20 mg total) by mouth daily    5. Screening for osteoporosis  -     DXA bone density spine hip and pelvis; Future; Expected date: 07/17/2023    6. Acquired hypothyroidism  -     levothyroxine (Euthyrox) 112 mcg tablet; Take 1 tablet (112 mcg total) by mouth daily in the early morning    7. Depression, recurrent ([address])    8. 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 Diarrhea of infectious origin  -     metroNIDAZOLE (FLAGYL) 500 mg tablet; Take 1 tablet (500 mg total) by mouth every 12 (twelve) hours for 7 days           Subjective:      Patient ID: Bryan Echevarria is a 70 y.o. female. Follow-up on multiple medical problems to ensure they are stable on current medication, diarrhea, no blood in the bowel movement      The following portions of the patient's history were reviewed and updated as appropriate: She  has a past medical history of Anxiety, Arthritis, Dementia ([address]), Depression, Disease of thyroid gland, Diverticulitis of colon, Hypertension, and Urinary incontinence.   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 Her family history includes Dementia in her mother. She  reports that she has never smoked. She has never used smokeless tobacco. She reports that she does not drink alcohol and does not use drugs. Current Outpatient Medications   Medication Sig Dispense Refill   • atorvastatin (LIPITOR) 20 mg tablet Take 1 tablet (20 mg total) by mouth daily 180 tablet 0   • dicyclomine (BENTYL) 20 mg tablet Take 1 tablet (20 mg total) by mouth 2 (two) times a day as needed (abd pain) 20 tablet 0   • donepezil (ARICEPT) 10 mg tablet Take 1 tablet (10 mg total) by mouth daily at bedtime 180 tablet 0   • irbesartan (AVAPRO) 150 mg tablet Take 1 tablet (150 mg total) by mouth daily at bedtime 180 tablet 0   • levothyroxine (Euthyrox) 112 mcg tablet Take 1 tablet (112 mcg total) by mouth daily in the early morning 90 tablet 3   • meloxicam (Mobic) 7.5 mg tablet Take 1 tablet (7.5 mg total) by mouth daily 180 tablet 0   • metroNIDAZOLE (FLAGYL) 500 mg tablet Take 1 tablet (500 mg total) by mouth every 12 (twelve) hours for 7 days 14 tablet 0   • mirtazapine (REMERON) 15 mg tablet Take 1 tablet (15 mg total) by mouth daily at bedtime 180 tablet 0     No current facility-administered medications for this visit.      Current Outpatient Medications on File Prior to Visit   Medication Sig   • meloxicam (Mobic) 7.5 mg tablet Take 1 tablet (7.5 mg total) by mouth daily   • [DISCONTINUED] atorvastatin (LIPITOR) 20 mg tablet Take 1 tablet (20 mg total) by mouth daily   • [DISCONTINUED] dicyclomine (BENTYL) 20 mg tablet Take 1 tablet (20 mg total) by mouth 2 (two) times a day as needed (abd pain)   • [DISCONTINUED] donepezil (ARICEPT) 10 mg tablet Take 1 tablet (10 mg total) by mouth daily at bedtime   • [DISCONTINUED] irbesartan (AVAPRO) 150 mg tablet Take 1 tablet (150 mg total) by mouth daily at bedtime   • [DISCONTINUED] levothyroxine (Euthyrox) 112 mcg tablet Take 1 tablet (112 mcg total) by mouth daily in the early morning   • [DISCONTINUED] mirtazapine (REMERON) 15 mg tablet Take 1 tablet (15 mg total) by mouth daily at bedtime     No current facility-administered medications on file prior to visit. She has No Known Allergies. .    Review of Systems   Constitutional: Negative for chills and fever. HENT: Negative for congestion, ear pain and sore throat. Eyes: Negative for pain. Respiratory: Negative for cough and shortness of breath. Cardiovascular: Negative for chest pain and leg swelling. Gastrointestinal: Positive for diarrhea. Negative for abdominal pain, blood in stool, nausea and vomiting. Endocrine: Negative for polyuria. Genitourinary: Negative for difficulty urinating, frequency and urgency. Musculoskeletal: Positive for arthralgias. Negative for back pain. Skin: Negative for rash. Neurological: Negative for weakness and headaches. Psychiatric/Behavioral: Negative for sleep disturbance. The patient is not nervous/anxious. Objective:      /86 (BP Location: Left arm, Patient Position: Sitting, Cuff Size: Standard)   Pulse 56   Temp 97.6 °F (36.4 °C) (Temporal)   Ht 4' 11" (1.499 m)   Wt 59 kg (130 lb)   SpO2 99%   BMI 26.26 kg/m²     Recent Results (from the past 1344 hour(s))   TSH, 3rd generation    Collection Time: 07/15/23 10:47 AM   Result Value Ref Range    TSH 3RD GENERATON 5.720 (H) 0.450 - 4.500 uIU/mL        Physical Exam  Constitutional:       Appearance: Normal appearance. HENT:      Head: Normocephalic.       Right Ear: Tympanic membrane, ear canal and external ear normal.      Left Ear: Tympanic membrane, ear canal and external ear normal.      Nose: Nose normal. No congestion. Mouth/Throat:      Mouth: Mucous membranes are moist.      Pharynx: Oropharynx is clear. No oropharyngeal exudate or posterior oropharyngeal erythema. Eyes:      Extraocular Movements: Extraocular movements intact. Conjunctiva/sclera: Conjunctivae normal.   Cardiovascular:      Rate and Rhythm: Normal rate and regular rhythm. Heart sounds: Normal heart sounds. No murmur heard. Pulmonary:      Effort: Pulmonary effort is normal.      Breath sounds: Normal breath sounds. No wheezing or rales. Abdominal:      General: Bowel sounds are normal. There is no distension. Palpations: Abdomen is soft. Tenderness: There is no abdominal tenderness. Musculoskeletal:         General: Normal range of motion. Cervical back: Normal range of motion and neck supple. Right lower leg: No edema. Left lower leg: No edema. Lymphadenopathy:      Cervical: No cervical adenopathy. Skin:     General: Skin is warm. Neurological:      General: No focal deficit present. Mental Status: She is alert and oriented to person, place, and time.

## 2023-07-31 ENCOUNTER — HOSPITAL ENCOUNTER (OUTPATIENT)
Dept: RADIOLOGY | Facility: HOSPITAL | Age: 78
Discharge: HOME | End: 2023-07-31
Payer: COMMERCIAL

## 2023-07-31 DIAGNOSIS — R41.3 MEMORY LOSS: ICD-10-CM

## 2023-07-31 PROCEDURE — A9585 GADOBUTROL INJECTION: HCPCS | Performed by: PSYCHIATRY & NEUROLOGY

## 2023-07-31 PROCEDURE — G1004 CDSM NDSC: HCPCS

## 2023-07-31 PROCEDURE — 70553 MRI BRAIN STEM W/O & W/DYE: CPT

## 2023-07-31 RX ADMIN — GADOBUTROL 6 ML: 604.72 INJECTION INTRAVENOUS at 19:45

## 2023-08-07 ENCOUNTER — TELEPHONE (OUTPATIENT)
Dept: NEUROLOGY | Facility: CLINIC | Age: 78
End: 2023-08-07

## 2023-08-07 DIAGNOSIS — F03.B0 MODERATE DEMENTIA (HCC): Primary | ICD-10-CM

## 2023-08-07 NOTE — TELEPHONE ENCOUNTER
Talked with Daughter in law and son who are both POA/caretakers of patient. Updated them about MRI brain Neuroquant findings. Told them that MRI brain showed small chronic right frontal cortical infarct and very small hemorrhage with nonspecific white matter change suggesting microangiopathy. Also let them know that hippocampus was small but borderline in range which may either be congenital vs neurodegenerative. Explained that chronic strokes usually from several months ago to later (usually from 6 months to later, although there is variability of the duration on my research). At this point will hold on aspirin due to small hemorrhage and will order MRI brain neuroquant in 4 to 5 months to monitor the small stroke and hippocampus. If stable or improving at that time, will place on aspirin. Patient to go see Neuropsych soon according to daughter in law. Also recommended if any stroke like symptoms such as sudden weakness, numbness, slurred speech, facial droop, vertigo, balance problems to come to the ED ASAP. I will also reach out for appointment for followup in 1- 2 months. I will most likely schedule MRI brain at that time if possible. Daughter in law and son agreeable to above.

## 2023-08-08 NOTE — TELEPHONE ENCOUNTER
Hello Good Morning,     I have called the patient back , no answer LMOM I was able to schedule with you on 9/14/2023, [facility], 2 pm, Charles Mcgovern. I Sending a [employer] Message and also Mailing appointment Card.      Thank you all ,     Rita Valentine

## 2023-08-17 ENCOUNTER — OFFICE VISIT (OUTPATIENT)
Dept: INTERNAL MEDICINE CLINIC | Facility: CLINIC | Age: 78
End: 2023-08-17
Payer: COMMERCIAL

## 2023-08-17 VITALS
WEIGHT: 130 LBS | SYSTOLIC BLOOD PRESSURE: 136 MMHG | BODY MASS INDEX: 26.21 KG/M2 | HEART RATE: 57 BPM | OXYGEN SATURATION: 97 % | TEMPERATURE: 98 F | HEIGHT: 59 IN | DIASTOLIC BLOOD PRESSURE: 80 MMHG

## 2023-08-17 DIAGNOSIS — E03.9 ACQUIRED HYPOTHYROIDISM: ICD-10-CM

## 2023-08-17 DIAGNOSIS — F34.1 DYSTHYMIC DISORDER: ICD-10-CM

## 2023-08-17 DIAGNOSIS — R73.01 IMPAIRED FASTING BLOOD SUGAR: ICD-10-CM

## 2023-08-17 DIAGNOSIS — F02.A0 MILD EARLY ONSET ALZHEIMER'S DEMENTIA WITHOUT BEHAVIORAL DISTURBANCE, PSYCHOTIC DISTURBANCE, MOOD DISTURBANCE, OR ANXIETY (HCC): ICD-10-CM

## 2023-08-17 DIAGNOSIS — I10 BENIGN HYPERTENSION: Primary | ICD-10-CM

## 2023-08-17 DIAGNOSIS — N64.4 BREAST PAIN: ICD-10-CM

## 2023-08-17 DIAGNOSIS — E78.2 MIXED HYPERLIPIDEMIA: ICD-10-CM

## 2023-08-17 DIAGNOSIS — G30.0 MILD EARLY ONSET ALZHEIMER'S DEMENTIA WITHOUT BEHAVIORAL DISTURBANCE, PSYCHOTIC DISTURBANCE, MOOD DISTURBANCE, OR ANXIETY (HCC): ICD-10-CM

## 2023-08-17 PROCEDURE — 99214 OFFICE O/P EST MOD 30 MIN: CPT | Performed by: INTERNAL MEDICINE

## 2023-08-17 NOTE — PROGRESS NOTES
Assessment/Plan:             1. Benign hypertension  -     Comprehensive metabolic panel; Future  -     Lipid Panel with Direct LDL reflex; Future    2. Mixed hyperlipidemia  -     Comprehensive metabolic panel; Future    3. Acquired hypothyroidism  -     TSH, 3rd generation; Future    4. 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, psychotic disturbance, mood disturbance, or anxiety ([address])    5. Dysthymic disorder    6. Breast pain  -     Mammo diagnostic bilateral w cad; Future; Expected date: 08/17/2023    7. Impaired fasting blood sugar  -     CBC and differential; Future  -     Hemoglobin A1C; Future           Subjective:      Patient ID: Kianna Nagy is a 70 y.o. female. Follow-up on multiple medical problems to ensure they are stable on current medications      The following portions of the patient's history were reviewed and updated as appropriate: She  has a past medical history of Anxiety, Arthritis, Dementia ([address]), Depression, Disease of thyroid gland, Diverticulitis of colon, Hypertension, and Urinary incontinence. She   Patient Active Problem List    Diagnosis Date Noted   • Depression, recurrent ([address]) 07/17/2023   • Dysthymic disorder 01/16/2023   • 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, psychotic disturbance, mood disturbance, or anxiety ([address]) 01/16/2023   • Moderate dementia ([address]) 01/10/2023   • Rotator cuff arthropathy of both shoulders 11/10/2022   • Benign hypertension 11/10/2022   • Mixed hyperlipidemia 11/10/2022   • Acquired hypothyroidism 11/10/2022   • Memory loss 11/10/2022     She  has a past surgical history that includes Breast surgery; Colon surgery; and Knee surgery. Her family history includes Dementia in her mother. She  reports that she has never smoked. She has never used smokeless tobacco. She reports that she does not drink alcohol and does not use drugs.   Current Outpatient Medications   Medication Sig Dispense Refill • atorvastatin (LIPITOR) 20 mg tablet Take 1 tablet (20 mg total) by mouth daily 180 tablet 0   • dicyclomine (BENTYL) 20 mg tablet Take 1 tablet (20 mg total) by mouth 2 (two) times a day as needed (abd pain) 20 tablet 0   • donepezil (ARICEPT) 10 mg tablet Take 1 tablet (10 mg total) by mouth daily at bedtime 180 tablet 0   • irbesartan (AVAPRO) 150 mg tablet Take 1 tablet (150 mg total) by mouth daily at bedtime 180 tablet 0   • levothyroxine (Euthyrox) 112 mcg tablet Take 1 tablet (112 mcg total) by mouth daily in the early morning 90 tablet 3   • meloxicam (Mobic) 7.5 mg tablet Take 1 tablet (7.5 mg total) by mouth daily 180 tablet 0   • mirtazapine (REMERON) 15 mg tablet Take 1 tablet (15 mg total) by mouth daily at bedtime 180 tablet 0     No current facility-administered medications for this visit. Current Outpatient Medications on File Prior to Visit   Medication Sig   • atorvastatin (LIPITOR) 20 mg tablet Take 1 tablet (20 mg total) by mouth daily   • dicyclomine (BENTYL) 20 mg tablet Take 1 tablet (20 mg total) by mouth 2 (two) times a day as needed (abd pain)   • donepezil (ARICEPT) 10 mg tablet Take 1 tablet (10 mg total) by mouth daily at bedtime   • irbesartan (AVAPRO) 150 mg tablet Take 1 tablet (150 mg total) by mouth daily at bedtime   • levothyroxine (Euthyrox) 112 mcg tablet Take 1 tablet (112 mcg total) by mouth daily in the early morning   • meloxicam (Mobic) 7.5 mg tablet Take 1 tablet (7.5 mg total) by mouth daily   • mirtazapine (REMERON) 15 mg tablet Take 1 tablet (15 mg total) by mouth daily at bedtime     No current facility-administered medications on file prior to visit. She has No Known Allergies. .    Review of Systems   Constitutional: Negative for chills and fever. HENT: Negative for congestion, ear pain and sore throat. Eyes: Negative for pain. Respiratory: Negative for cough and shortness of breath. Cardiovascular: Negative for chest pain and leg swelling. Gastrointestinal: Negative for abdominal pain, nausea and vomiting. Endocrine: Negative for polyuria. Genitourinary: Negative for difficulty urinating, frequency and urgency. Musculoskeletal: Negative for arthralgias and back pain. Skin: Negative for rash. Neurological: Negative for weakness and headaches. Psychiatric/Behavioral: Negative for sleep disturbance. The patient is not nervous/anxious. Objective:      /80 (BP Location: Left arm, Patient Position: Sitting, Cuff Size: Adult)   Pulse 57   Temp 98 °F (36.7 °C)   Ht 4' 11" (1.499 m)   Wt 59 kg (130 lb)   SpO2 97%   BMI 26.26 kg/m²     Recent Results (from the past 1344 hour(s))   TSH, 3rd generation    Collection Time: 07/15/23 10:47 AM   Result Value Ref Range    TSH 3RD GENERATON 5.720 (H) 0.450 - 4.500 uIU/mL        Physical Exam  Constitutional:       Appearance: Normal appearance. HENT:      Head: Normocephalic. Right Ear: External ear normal.      Left Ear: External ear normal.      Nose: Nose normal. No congestion. Mouth/Throat:      Mouth: Mucous membranes are moist.      Pharynx: Oropharynx is clear. No oropharyngeal exudate or posterior oropharyngeal erythema. Eyes:      Extraocular Movements: Extraocular movements intact. Conjunctiva/sclera: Conjunctivae normal.   Cardiovascular:      Rate and Rhythm: Normal rate and regular rhythm. Heart sounds: Normal heart sounds. No murmur heard. Pulmonary:      Effort: Pulmonary effort is normal.      Breath sounds: Normal breath sounds. No wheezing or rales. Abdominal:      General: Bowel sounds are normal. There is no distension. Palpations: Abdomen is soft. Tenderness: There is no abdominal tenderness. Musculoskeletal:         General: Normal range of motion. Cervical back: Normal range of motion and neck supple. Right lower leg: No edema. Left lower leg: No edema.    Lymphadenopathy:      Cervical: No cervical adenopathy. Skin:     General: Skin is warm. Neurological:      General: No focal deficit present. Mental Status: She is alert and oriented to person, place, and time.

## 2023-09-13 ENCOUNTER — TELEPHONE (OUTPATIENT)
Dept: NEUROLOGY | Facility: CLINIC | Age: 78
End: 2023-09-13

## 2023-09-13 NOTE — TELEPHONE ENCOUNTER
Called spoke to patient's daughter in law confirmed [place] [facility] [place]

## 2023-09-14 ENCOUNTER — OFFICE VISIT (OUTPATIENT)
Dept: NEUROLOGY | Facility: CLINIC | Age: 78
End: 2023-09-14
Payer: COMMERCIAL

## 2023-09-14 VITALS
HEIGHT: 59 IN | DIASTOLIC BLOOD PRESSURE: 80 MMHG | SYSTOLIC BLOOD PRESSURE: 150 MMHG | BODY MASS INDEX: 26.81 KG/M2 | HEART RATE: 102 BPM | WEIGHT: 133 LBS

## 2023-09-14 DIAGNOSIS — F02.A0 MILD EARLY ONSET ALZHEIMER'S DEMENTIA WITHOUT BEHAVIORAL DISTURBANCE, PSYCHOTIC DISTURBANCE, MOOD DISTURBANCE, OR ANXIETY (HCC): Primary | ICD-10-CM

## 2023-09-14 DIAGNOSIS — G30.0 MILD EARLY ONSET ALZHEIMER'S DEMENTIA WITHOUT BEHAVIORAL DISTURBANCE, PSYCHOTIC DISTURBANCE, MOOD DISTURBANCE, OR ANXIETY (HCC): Primary | ICD-10-CM

## 2023-09-14 DIAGNOSIS — R41.3 MEMORY LOSS: ICD-10-CM

## 2023-09-14 PROBLEM — I63.9 STROKE (HCC): Status: ACTIVE | Noted: 2023-09-14

## 2023-09-14 PROCEDURE — 99214 OFFICE O/P EST MOD 30 MIN: CPT | Performed by: PSYCHIATRY & NEUROLOGY

## 2023-09-14 RX ORDER — MEMANTINE HYDROCHLORIDE 5 MG/1
5 TABLET ORAL 2 TIMES DAILY
Qty: 90 TABLET | Refills: 3 | Status: SHIPPED | OUTPATIENT
Start: 2023-09-14

## 2023-09-14 NOTE — ASSESSMENT & PLAN NOTE
Patient is a 70year old woman with hx of cognitive impairment, htn, hld, anxiety/depression presenting for followup for memory impairment. Memory has been stable since last followup with some greater repetitive behavior. Otherwise, no new symptoms with memory with no hallucinations, no ambulatory dysfunction. No weakness, no visual symptoms, no numbness. Patient continues to have good ADLs but patient is not driving. Patient continues to need assistance with finances. Son in the room explained patient has been having depressed mood and anxiety since moving from [place] and not being able to do her job in 3 months. Patient also has been having trouble sleeping as well (Patient is on remeron). Exam showed no focal motor, sensory, visual, ambulatory deficits. MOCA test not done due to patient already having neuropsychological testing 8/14. Patient had MRI brain neuroquant 7/31/2023, which showed decreased hippocampal occupancy score that may be more congenital rather than neurodegenerative. Also mentioned about chronic right cerebral infarction. Neuropsych testing 8/14 noted dementia diagnosis multifactorial with anxiety/depression. Impression: Patient most likely has dementia but memory loss is multifactorial with patient's anxiety and depression after moving to [place].     Plan:  Recommended patient to followup with PCP concerning remeron and adjust this medication to a different antidepressant  Patient doing well with aricept and will add namenda 5mg BID for memory  Followup in 6 months

## 2023-09-14 NOTE — ASSESSMENT & PLAN NOTE
MRI brain also showed evidence of possible small right frontal ischemia with petechial hemorrhage. I would recommend aspirin 81mg daily but would hold based on MRI evidence of petechial hemorrhage currently.

## 2023-09-14 NOTE — PATIENT INSTRUCTIONS
Talk to Dr. Marly Jeffrey concerning remeron and see if this can be changed to another medication due to insomnia    We will order memantine 5mg twice daily for memory    Followup in 6 months

## 2023-09-14 NOTE — PROGRESS NOTES
Patient ID: Sergo Bryant is a 70 y.o. female. Assessment/Plan:    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, psychotic disturbance, mood disturbance, or anxiety ([address])  Patient is a 70year old woman with hx of cognitive impairment, htn, hld, anxiety/depression presenting for followup for memory impairment. Memory has been stable since last followup with some greater repetitive behavior. Otherwise, no new symptoms with memory with no hallucinations, no ambulatory dysfunction. No weakness, no visual symptoms, no numbness. Patient continues to have good ADLs but patient is not driving. Patient continues to need assistance with finances. Son in the room explained patient has been having depressed mood and anxiety since moving from [place] and not being able to do her job in 3 months. Patient also has been having trouble sleeping as well (Patient is on remeron). Exam showed no focal motor, sensory, visual, ambulatory deficits. MOCA test not done due to patient already having neuropsychological testing 8/14. Patient had MRI brain neuroquant 7/31/2023, which showed decreased hippocampal occupancy score that may be more congenital rather than neurodegenerative. Also mentioned about chronic right cerebral infarction. Neuropsych testing 8/14 noted dementia diagnosis multifactorial with anxiety/depression. Impression: Patient most likely has dementia but memory loss is multifactorial with patient's anxiety and depression after moving to [place]. Plan:  Recommended patient to followup with PCP concerning remeron and adjust this medication to a different antidepressant  Patient doing well with aricept and will add namenda 5mg BID for memory  Followup in 6 months    Stroke [facility])  MRI brain also showed evidence of possible small right frontal ischemia with petechial hemorrhage.     I would recommend aspirin 81mg daily but would hold based on MRI evidence of petechial hemorrhage currently. Diagnoses and all orders for this visit:    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, psychotic disturbance, mood disturbance, or anxiety ([facility])    Memory loss  -     memantine (NAMENDA) 5 mg tablet; Take 1 tablet (5 mg total) by mouth 2 (two) times a day           Subjective:    Patient is a 70year old woman presenting for followup. Patient's memory has become worse with repetition of questions, stories compared to before. Patient has no trouble taking care of herself and lives with son and daughter in law. Patient is able to take medications by herself. No problems with speech. Patient is on aricept 10mg daily with no problems. Patient followed with Neuropsychology who states her memory problems are mulitfactorial from dementia and anxiety/depression. Patient does not drive. Patient continues to need assistance with finances. Otherwise no numbness, no weakness, no visual deficits, no ambulatory dysfunction. Patient has had trouble driving in [place]. Per previous visit:  "Memory issues unclear when first noticed. More long term memory rather than short term memory. Patient when cannot remember she starts hitting her head due to being in distress. She has times she forgot her coat. She forgets keep things secret and then when she was told that, she becomes distressed. Memory problem first noticed by patient but she is unsure when memory problems started. Daughter in law suspects possibly in a couple years. Memory loss seems gradual but there is paucity of information due to living with daughter in the past. Patient has difficulty with memory from the past like where she lived in the past and her family information from the past such as dates and when she moved. Memory has been improving according to daughter in law especially after primary doctor removed the sertraline 50mg off of the medication list. Patient is fluent in speech with no word finding difficulties. She does not drive but when she was in [place] she drove but had times when she was on a familiar road she did not know where she is. She does have one time when she did not where she was and in [place] she did have 1-2 times when she was lost in her house. She had a time when she gave personal information to an unknown person online but she then realized what she did and then become emotionally distressed but unsure if patient makes stories up as well. Family assists for her financial capabilities, because she has episodes that seems like patient is confabulating for attention. According to daughter in law, patient does have emotional instability due to missing friends and family from [place] and is now in a new location. She doesn't need a walker or cane and have had no falls. No confusion, no seizures, no staring spells. Patient sleeping well after placing patient on remeron for sleep. Patient has polyuria which has been controlled by her medicine doctor and can control when she needs to urinate. Denies hallucinations.      Mother and aunt may have had Alzheimer's in the past.     Also states having shaking of both hands when she's anxious or when talking to her daughter and does not have these shaking otherwise. No pacing and no agitated behavior. No suspicious behaviors, feeling withdrawn.     She has some numbness of the hands bilaterally intermittently which are occasional and no weakness. No vision problems. Patient says she has pain on back of the head which resolves with massaging of the head."      The following portions of the patient's history were reviewed and updated as appropriate:   She  has a past medical history of Anxiety, Arthritis, Dementia ([address] [facility]), Depression, Disease of thyroid gland, Diverticulitis of colon, Hypertension, and Urinary incontinence.   She   Patient Active Problem List    Diagnosis Date Noted   • Stroke [facility]) 09/14/2023   • Depression, recurrent ([address]) 07/17/2023   • Dysthymic disorder 01/16/2023   • 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, psychotic disturbance, mood disturbance, or anxiety ([address]) 01/16/2023   • Moderate dementia ([address]) 01/10/2023   • Rotator cuff arthropathy of both shoulders 11/10/2022   • Benign hypertension 11/10/2022   • Mixed hyperlipidemia 11/10/2022   • Acquired hypothyroidism 11/10/2022   • Memory loss 11/10/2022     She  has a past surgical history that includes Breast surgery; Colon surgery; and Knee surgery. Her family history includes Dementia in her mother. She  reports that she has never smoked. She has never used smokeless tobacco. She reports that she does not drink alcohol and does not use drugs. Current Outpatient Medications   Medication Sig Dispense Refill   • atorvastatin (LIPITOR) 20 mg tablet Take 1 tablet (20 mg total) by mouth daily 180 tablet 0   • dicyclomine (BENTYL) 20 mg tablet Take 1 tablet (20 mg total) by mouth 2 (two) times a day as needed (abd pain) 20 tablet 0   • donepezil (ARICEPT) 10 mg tablet Take 1 tablet (10 mg total) by mouth daily at bedtime 180 tablet 0   • irbesartan (AVAPRO) 150 mg tablet Take 1 tablet (150 mg total) by mouth daily at bedtime 180 tablet 0   • levothyroxine (Euthyrox) 112 mcg tablet Take 1 tablet (112 mcg total) by mouth daily in the early morning 90 tablet 3   • meloxicam (Mobic) 7.5 mg tablet Take 1 tablet (7.5 mg total) by mouth daily 180 tablet 0   • memantine (NAMENDA) 5 mg tablet Take 1 tablet (5 mg total) by mouth 2 (two) times a day 90 tablet 3   • mirtazapine (REMERON) 15 mg tablet Take 1 tablet (15 mg total) by mouth daily at bedtime 180 tablet 0     No current facility-administered medications for this visit.      Current Outpatient Medications on File Prior to Visit   Medication Sig   • atorvastatin (LIPITOR) 20 mg tablet Take 1 tablet (20 mg total) by mouth daily   • dicyclomine (BENTYL) 20 mg tablet Take 1 tablet (20 mg total) by mouth 2 (two) times a day as needed (abd pain)   • donepezil (ARICEPT) 10 mg tablet Take 1 tablet (10 mg total) by mouth daily at bedtime   • irbesartan (AVAPRO) 150 mg tablet Take 1 tablet (150 mg total) by mouth daily at bedtime   • levothyroxine (Euthyrox) 112 mcg tablet Take 1 tablet (112 mcg total) by mouth daily in the early morning   • meloxicam (Mobic) 7.5 mg tablet Take 1 tablet (7.5 mg total) by mouth daily   • mirtazapine (REMERON) 15 mg tablet Take 1 tablet (15 mg total) by mouth daily at bedtime     No current facility-administered medications on file prior to visit. She has No Known Allergies. .         Objective:    Blood pressure 150/80, pulse 102, height 4' 11" (1.499 m), weight 60.3 kg (133 lb). Physical Exam  Eyes:      Extraocular Movements: Extraocular movements intact. Neurological:      Motor: Motor strength is normal.     Deep Tendon Reflexes: Reflexes are normal and symmetric. Psychiatric:         Speech: Speech normal.         Neurological Exam  Mental Status  Awake, alert and oriented to person, place and time. Speech is normal. Language is fluent with no aphasia. Attention and concentration are normal.    Cranial Nerves  CN II: Visual fields full to confrontation. CN III, IV, VI: Extraocular movements intact bilaterally. CN V: Facial sensation is normal.  CN VII: Full and symmetric facial movement. CN XI: Shoulder shrug strength is normal.  CN XII: Tongue midline without atrophy or fasciculations. Motor   No abnormal involuntary movements. Strength is 5/5 throughout all four extremities. Sensory  Light touch is normal in upper and lower extremities. Reflexes  Deep tendon reflexes are 2+ and symmetric in all four extremities. Coordination  Right: Finger-to-nose normal.Left: Finger-to-nose normal.    Gait  Casual gait is normal including stance, stride, and arm swing. ROS:    Review of Systems   Constitutional: Negative for fever. HENT: Negative for sinus pain.     Respiratory: Negative for chest tightness and shortness of breath. Cardiovascular: Negative for chest pain. Gastrointestinal: Negative for constipation. Endocrine: Negative for polyuria. Musculoskeletal: Negative for back pain. Skin: Negative for pallor. Allergic/Immunologic: Negative for food allergies. Neurological: Negative for speech difficulty, weakness and numbness.

## 2023-11-08 ENCOUNTER — HOSPITAL ENCOUNTER (OUTPATIENT)
Dept: ULTRASOUND IMAGING | Facility: CLINIC | Age: 78
Discharge: HOME | End: 2023-11-08
Payer: COMMERCIAL

## 2023-11-08 ENCOUNTER — HOSPITAL ENCOUNTER (OUTPATIENT)
Dept: MAMMOGRAPHY | Facility: CLINIC | Age: 78
Discharge: HOME | End: 2023-11-08
Payer: COMMERCIAL

## 2023-11-08 VITALS — WEIGHT: 133 LBS | HEIGHT: 59 IN | BODY MASS INDEX: 26.81 KG/M2

## 2023-11-08 DIAGNOSIS — N64.4 BREAST PAIN: ICD-10-CM

## 2023-11-08 PROCEDURE — 77066 DX MAMMO INCL CAD BI: CPT

## 2023-11-08 PROCEDURE — G0279 TOMOSYNTHESIS, MAMMO: HCPCS

## 2023-11-08 PROCEDURE — 76642 ULTRASOUND BREAST LIMITED: CPT

## 2023-12-07 ENCOUNTER — HOSPITAL ENCOUNTER (OUTPATIENT)
Dept: RADIOLOGY | Facility: HOSPITAL | Age: 78
Discharge: HOME | End: 2023-12-07
Payer: COMMERCIAL

## 2023-12-07 DIAGNOSIS — F03.B0 MODERATE DEMENTIA (HCC): ICD-10-CM

## 2023-12-07 PROCEDURE — G1004 CDSM NDSC: HCPCS

## 2023-12-07 PROCEDURE — 70551 MRI BRAIN STEM W/O DYE: CPT

## 2023-12-13 DIAGNOSIS — G30.0 MILD EARLY ONSET ALZHEIMER'S DEMENTIA WITHOUT BEHAVIORAL DISTURBANCE, PSYCHOTIC DISTURBANCE, MOOD DISTURBANCE, OR ANXIETY (HCC): Primary | ICD-10-CM

## 2023-12-13 DIAGNOSIS — F02.A0 MILD EARLY ONSET ALZHEIMER'S DEMENTIA WITHOUT BEHAVIORAL DISTURBANCE, PSYCHOTIC DISTURBANCE, MOOD DISTURBANCE, OR ANXIETY (HCC): Primary | ICD-10-CM

## 2023-12-15 ENCOUNTER — TELEPHONE (OUTPATIENT)
Dept: NEUROLOGY | Facility: CLINIC | Age: 78
End: 2023-12-15

## 2023-12-15 NOTE — TELEPHONE ENCOUNTER
----- Message from Shawanda Rich MD sent at 12/13/2023  6:43 PM EST -----  Regarding: Followup  Hello! To whom it may concern,    Could we have patient to followup in 3/2024? I don't see any future neurology followup for her. Thank you!

## 2023-12-18 ENCOUNTER — TELEPHONE (OUTPATIENT)
Dept: NEUROLOGY | Facility: CLINIC | Age: 78
End: 2023-12-18

## 2023-12-18 NOTE — TELEPHONE ENCOUNTER
----- Message from Cristian Paredes MD sent at 12/13/2023  6:41 PM EST -----  Let patient know that the MRI brain showed no significant change from previous MRI brain.

## 2023-12-21 ENCOUNTER — HOSPITAL ENCOUNTER (OUTPATIENT)
Dept: RADIOLOGY | Age: 78
Discharge: HOME | End: 2023-12-21
Payer: COMMERCIAL

## 2023-12-21 DIAGNOSIS — Z13.820 SCREENING FOR OSTEOPOROSIS: ICD-10-CM

## 2023-12-21 PROCEDURE — 77080 DXA BONE DENSITY AXIAL: CPT

## 2024-01-12 ENCOUNTER — RA CDI HCC (OUTPATIENT)
Dept: OTHER | Facility: HOSPITAL | Age: 79
End: 2024-01-12

## 2024-01-20 ENCOUNTER — APPOINTMENT (OUTPATIENT)
Dept: LAB | Facility: CLINIC | Age: 79
End: 2024-01-20
Payer: COMMERCIAL

## 2024-01-20 DIAGNOSIS — I10 BENIGN HYPERTENSION: ICD-10-CM

## 2024-01-20 DIAGNOSIS — E78.2 MIXED HYPERLIPIDEMIA: ICD-10-CM

## 2024-01-20 DIAGNOSIS — E03.9 ACQUIRED HYPOTHYROIDISM: ICD-10-CM

## 2024-01-20 DIAGNOSIS — R73.01 IMPAIRED FASTING BLOOD SUGAR: ICD-10-CM

## 2024-01-20 LAB
ALBUMIN SERPL BCP-MCNC: 4.1 G/DL (ref 3.5–5)
ALP SERPL-CCNC: 81 U/L (ref 34–104)
ALT SERPL W P-5'-P-CCNC: 11 U/L (ref 7–52)
ANION GAP SERPL CALCULATED.3IONS-SCNC: 9 MMOL/L
AST SERPL W P-5'-P-CCNC: 20 U/L (ref 13–39)
BILIRUB SERPL-MCNC: 0.54 MG/DL (ref 0.2–1)
BUN SERPL-MCNC: 26 MG/DL (ref 5–25)
CALCIUM SERPL-MCNC: 9 MG/DL (ref 8.4–10.2)
CHLORIDE SERPL-SCNC: 103 MMOL/L (ref 96–108)
CHOLEST SERPL-MCNC: 137 MG/DL
CO2 SERPL-SCNC: 30 MMOL/L (ref 21–32)
CREAT SERPL-MCNC: 1.1 MG/DL (ref 0.6–1.3)
EST. AVERAGE GLUCOSE BLD GHB EST-MCNC: 108 MG/DL
GFR SERPL CREATININE-BSD FRML MDRD: 48 ML/MIN/1.73SQ M
GLUCOSE P FAST SERPL-MCNC: 87 MG/DL (ref 65–99)
HBA1C MFR BLD: 5.4 %
HDLC SERPL-MCNC: 55 MG/DL
LDLC SERPL CALC-MCNC: 55 MG/DL (ref 0–100)
POTASSIUM SERPL-SCNC: 4.2 MMOL/L (ref 3.5–5.3)
PROT SERPL-MCNC: 6.7 G/DL (ref 6.4–8.4)
SODIUM SERPL-SCNC: 142 MMOL/L (ref 135–147)
TRIGL SERPL-MCNC: 133 MG/DL
TSH SERPL DL<=0.05 MIU/L-ACNC: 0.55 UIU/ML (ref 0.45–4.5)

## 2024-01-20 PROCEDURE — 36415 COLL VENOUS BLD VENIPUNCTURE: CPT

## 2024-01-20 PROCEDURE — 80061 LIPID PANEL: CPT

## 2024-01-20 PROCEDURE — 80053 COMPREHEN METABOLIC PANEL: CPT

## 2024-01-20 PROCEDURE — 84443 ASSAY THYROID STIM HORMONE: CPT

## 2024-01-20 PROCEDURE — 83036 HEMOGLOBIN GLYCOSYLATED A1C: CPT

## 2024-01-22 ENCOUNTER — OFFICE VISIT (OUTPATIENT)
Dept: INTERNAL MEDICINE CLINIC | Facility: CLINIC | Age: 79
End: 2024-01-22
Payer: COMMERCIAL

## 2024-01-22 VITALS
TEMPERATURE: 98 F | OXYGEN SATURATION: 97 % | HEART RATE: 74 BPM | SYSTOLIC BLOOD PRESSURE: 120 MMHG | DIASTOLIC BLOOD PRESSURE: 78 MMHG | WEIGHT: 168 LBS | BODY MASS INDEX: 33.87 KG/M2 | HEIGHT: 59 IN

## 2024-01-22 DIAGNOSIS — Z00.00 MEDICARE ANNUAL WELLNESS VISIT, SUBSEQUENT: ICD-10-CM

## 2024-01-22 DIAGNOSIS — E78.2 MIXED HYPERLIPIDEMIA: ICD-10-CM

## 2024-01-22 DIAGNOSIS — I10 BENIGN HYPERTENSION: ICD-10-CM

## 2024-01-22 DIAGNOSIS — F02.A0 MILD EARLY ONSET ALZHEIMER'S DEMENTIA WITHOUT BEHAVIORAL DISTURBANCE, PSYCHOTIC DISTURBANCE, MOOD DISTURBANCE, OR ANXIETY (HCC): ICD-10-CM

## 2024-01-22 DIAGNOSIS — G30.0 MILD EARLY ONSET ALZHEIMER'S DEMENTIA WITHOUT BEHAVIORAL DISTURBANCE, PSYCHOTIC DISTURBANCE, MOOD DISTURBANCE, OR ANXIETY (HCC): ICD-10-CM

## 2024-01-22 DIAGNOSIS — J04.10 TRACHEITIS: ICD-10-CM

## 2024-01-22 DIAGNOSIS — E03.9 ACQUIRED HYPOTHYROIDISM: Primary | ICD-10-CM

## 2024-01-22 DIAGNOSIS — F33.9 DEPRESSION, RECURRENT (HCC): ICD-10-CM

## 2024-01-22 PROCEDURE — 99214 OFFICE O/P EST MOD 30 MIN: CPT | Performed by: INTERNAL MEDICINE

## 2024-01-22 PROCEDURE — G0439 PPPS, SUBSEQ VISIT: HCPCS | Performed by: INTERNAL MEDICINE

## 2024-01-22 RX ORDER — BENZONATATE 100 MG/1
100 CAPSULE ORAL 3 TIMES DAILY PRN
Qty: 20 CAPSULE | Refills: 0 | Status: SHIPPED | OUTPATIENT
Start: 2024-01-22

## 2024-01-22 RX ORDER — AZITHROMYCIN 250 MG/1
TABLET, FILM COATED ORAL DAILY
Qty: 6 TABLET | Refills: 0 | Status: SHIPPED | OUTPATIENT
Start: 2024-01-22 | End: 2024-01-27

## 2024-01-22 NOTE — PROGRESS NOTES
Assessment/Plan:      Depression Screening and Follow-up Plan: Patient was screened for depression during today's encounter. They screened negative with a PHQ-9 score of 4.    Falls Plan of Care: balance, strength, and gait training instructions were provided.     Urinary Incontinence Plan of Care: counseling topics discussed: use restroom every 2 hours and limit alcohol, caffeine, spicy foods, and acidic foods.     Advised her to hold the memantine right now, we will see her back in 6 weeks, to decide further for continuation of the course of memantine.        1. Acquired hypothyroidism    2. Tracheitis  -     azithromycin (Zithromax) 250 mg tablet; Take 2 tablets (500 mg total) by mouth daily for 1 day, THEN 1 tablet (250 mg total) daily for 4 days.  -     benzonatate (TESSALON PERLES) 100 mg capsule; Take 1 capsule (100 mg total) by mouth 3 (three) times a day as needed for cough    3. Mixed hyperlipidemia    4. 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 Benign hypertension    6. Depression, recurrent (HCC)    7. Medicare annual wellness visit, subsequent           Subjective:      Patient ID: Jacklyn Angeles is a 78 y.o. female.    Follow-up on blood test done on 1/20/2024 test discussed with her, also Medicare wellness exam, cough, sick started on memantine for 1 month, as per caregiver, daughter-in-law, looks like her mentation is getting worse    Cough  Pertinent negatives include no chest pain, chills, ear pain, fever, headaches, rash, sore throat or shortness of breath.   Altered Mental Status  Associated symptoms include coughing. Pertinent negatives include no abdominal pain, arthralgias, chest pain, chills, congestion, fever, headaches, nausea, rash, sore throat, vomiting or weakness.       The following portions of the patient's history were reviewed and updated as appropriate: She  has a past medical history of  Anxiety, Arthritis, Breast cancer (HCC), Dementia (HCC), Depression, Disease of thyroid gland, Diverticulitis of colon, History of chemotherapy, Hypertension, and Urinary incontinence.  She   Patient Active Problem List    Diagnosis Date Noted    Stroke (HCC) 09/14/2023    Depression, recurrent (HCC) 07/17/2023    Medicare annual wellness visit, subsequent 01/16/2023    Dysthymic disorder 01/16/2023    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, psychotic disturbance, mood disturbance, or anxiety (HCC) 01/16/2023    Moderate dementia (HCC) 01/10/2023    Rotator cuff arthropathy of both shoulders 11/10/2022    Benign hypertension 11/10/2022    Mixed hyperlipidemia 11/10/2022    Acquired hypothyroidism 11/10/2022    Memory loss 11/10/2022     She  has a past surgical history that includes Breast surgery; Colon surgery; Knee surgery; and Breast lumpectomy (Left).  Her family history includes Dementia in her mother.  She  reports that she has never smoked. She has never used smokeless tobacco. She reports that she does not drink alcohol and does not use drugs.  Current Outpatient Medications   Medication Sig Dispense Refill    atorvastatin (LIPITOR) 20 mg tablet Take 1 tablet (20 mg total) by mouth daily 180 tablet 0    azithromycin (Zithromax) 250 mg tablet Take 2 tablets (500 mg total) by mouth daily for 1 day, THEN 1 tablet (250 mg total) daily for 4 days. 6 tablet 0    benzonatate (TESSALON PERLES) 100 mg capsule Take 1 capsule (100 mg total) by mouth 3 (three) times a day as needed for cough 20 capsule 0    dicyclomine (BENTYL) 20 mg tablet Take 1 tablet (20 mg total) by mouth 2 (two) times a day as needed (abd pain) 20 tablet 0    donepezil (ARICEPT) 10 mg tablet Take 1 tablet (10 mg total) by mouth daily at bedtime 180 tablet 0    irbesartan (AVAPRO) 150 mg tablet Take 1 tablet (150 mg total) by mouth daily at bedtime 180 tablet 0    levothyroxine (Euthyrox) 112 mcg tablet Take 1 tablet  (112 mcg total) by mouth daily in the early morning 90 tablet 3    meloxicam (Mobic) 7.5 mg tablet Take 1 tablet (7.5 mg total) by mouth daily 180 tablet 0    memantine (NAMENDA) 5 mg tablet Take 1 tablet (5 mg total) by mouth 2 (two) times a day 90 tablet 3    mirtazapine (REMERON) 15 mg tablet Take 1 tablet (15 mg total) by mouth daily at bedtime 180 tablet 0     No current facility-administered medications for this visit.     Current Outpatient Medications on File Prior to Visit   Medication Sig    atorvastatin (LIPITOR) 20 mg tablet Take 1 tablet (20 mg total) by mouth daily    dicyclomine (BENTYL) 20 mg tablet Take 1 tablet (20 mg total) by mouth 2 (two) times a day as needed (abd pain)    donepezil (ARICEPT) 10 mg tablet Take 1 tablet (10 mg total) by mouth daily at bedtime    irbesartan (AVAPRO) 150 mg tablet Take 1 tablet (150 mg total) by mouth daily at bedtime    levothyroxine (Euthyrox) 112 mcg tablet Take 1 tablet (112 mcg total) by mouth daily in the early morning    meloxicam (Mobic) 7.5 mg tablet Take 1 tablet (7.5 mg total) by mouth daily    memantine (NAMENDA) 5 mg tablet Take 1 tablet (5 mg total) by mouth 2 (two) times a day    mirtazapine (REMERON) 15 mg tablet Take 1 tablet (15 mg total) by mouth daily at bedtime     No current facility-administered medications on file prior to visit.     She has No Known Allergies..    Review of Systems   Constitutional:  Negative for chills and fever.   HENT:  Negative for congestion, ear pain and sore throat.    Eyes:  Negative for pain.   Respiratory:  Positive for cough. Negative for shortness of breath.    Cardiovascular:  Negative for chest pain and leg swelling.   Gastrointestinal:  Negative for abdominal pain, nausea and vomiting.   Endocrine: Negative for polyuria.   Genitourinary:  Negative for difficulty urinating, frequency and urgency.   Musculoskeletal:  Negative for arthralgias and back pain.   Skin:  Negative for rash.   Neurological:  Negative  "for weakness and headaches.   Psychiatric/Behavioral:  Negative for sleep disturbance. The patient is not nervous/anxious.          Objective:      /78 (BP Location: Left arm, Patient Position: Sitting, Cuff Size: Standard)   Pulse 74   Temp 98 °F (36.7 °C) (Temporal)   Ht 4' 11\" (1.499 m)   Wt 76.2 kg (168 lb)   SpO2 97%   BMI 33.93 kg/m²     Recent Results (from the past 1344 hour(s))   Comprehensive metabolic panel    Collection Time: 01/20/24 10:54 AM   Result Value Ref Range    Sodium 142 135 - 147 mmol/L    Potassium 4.2 3.5 - 5.3 mmol/L    Chloride 103 96 - 108 mmol/L    CO2 30 21 - 32 mmol/L    ANION GAP 9 mmol/L    BUN 26 (H) 5 - 25 mg/dL    Creatinine 1.10 0.60 - 1.30 mg/dL    Glucose, Fasting 87 65 - 99 mg/dL    Calcium 9.0 8.4 - 10.2 mg/dL    AST 20 13 - 39 U/L    ALT 11 7 - 52 U/L    Alkaline Phosphatase 81 34 - 104 U/L    Total Protein 6.7 6.4 - 8.4 g/dL    Albumin 4.1 3.5 - 5.0 g/dL    Total Bilirubin 0.54 0.20 - 1.00 mg/dL    eGFR 48 ml/min/1.73sq m   Hemoglobin A1C    Collection Time: 01/20/24 10:54 AM   Result Value Ref Range    Hemoglobin A1C 5.4 Normal 4.0-5.6%; PreDiabetic 5.7-6.4%; Diabetic >=6.5%; Glycemic control for adults with diabetes <7.0% %     mg/dl   Lipid Panel with Direct LDL reflex    Collection Time: 01/20/24 10:54 AM   Result Value Ref Range    Cholesterol 137 See Comment mg/dL    Triglycerides 133 See Comment mg/dL    HDL, Direct 55 >=50 mg/dL    LDL Calculated 55 0 - 100 mg/dL   TSH, 3rd generation    Collection Time: 01/20/24 10:54 AM   Result Value Ref Range    TSH 3RD GENERATON 0.546 0.450 - 4.500 uIU/mL        Physical Exam  Constitutional:       Appearance: Normal appearance.   HENT:      Head: Normocephalic.      Right Ear: Tympanic membrane, ear canal and external ear normal.      Left Ear: Tympanic membrane, ear canal and external ear normal.      Nose: Nose normal. No congestion.      Mouth/Throat:      Mouth: Mucous membranes are moist.      Pharynx: " "Oropharynx is clear. No oropharyngeal exudate or posterior oropharyngeal erythema.   Eyes:      Extraocular Movements: Extraocular movements intact.      Conjunctiva/sclera: Conjunctivae normal.      Pupils: Pupils are equal, round, and reactive to light.   Cardiovascular:      Rate and Rhythm: Normal rate and regular rhythm.      Heart sounds: Normal heart sounds. No murmur heard.  Pulmonary:      Effort: Pulmonary effort is normal.      Breath sounds: Wheezing present. No rales.   Abdominal:      General: There is no distension.      Palpations: Abdomen is soft.      Tenderness: There is no abdominal tenderness.   Musculoskeletal:         General: Normal range of motion.      Cervical back: Normal range of motion and neck supple.      Right lower leg: No edema.      Left lower leg: No edema.   Lymphadenopathy:      Cervical: No cervical adenopathy.   Skin:     General: Skin is warm.   Neurological:      General: No focal deficit present.      Mental Status: She is alert and oriented to person, place, and time.           Answers submitted by the patient for this visit:  Medicare Annual Wellness Visit (Submitted on 1/19/2024)  How would you rate your overall health?: good  Compared to last year, how is your physical health?: same  In general, how satisfied are you with your life?: satisfied  Compared to last year, how is your eyesight?: same  Compared to last year, how is your hearing?: same  Compared to last year, how is your emotional/mental health?: same  How often is anger a problem for you?: never, rarely  How often do you feel unusually tired/fatigued?: sometimes  In the past 7 days, how much pain have you experienced?: some  If you answered \"some\" or \"a lot\", please rate the severity of your pain on a scale of 1 to 10 (1 being the least severe pain and 10 being the most intense pain).: 4/10  In the past 6 months, have you lost or gained 10 pounds without trying?: No  One or more falls in the last year: No  In " the past 6 months, have you accidentally leaked urine?: No  Do you have trouble with the stairs inside or outside your home?: No  Does your home have working smoke alarms?: Yes  Does your home have a carbon monoxide monitor?: Yes  Which safety hazards (if any) have you experienced in your home? Please select all that apply.: none  How would you describe your current diet? Please select all that apply.: Regular  In addition to prescription medications, are you taking any over-the-counter supplements?: No  Can you manage your medications?: No  Additional comments : My daughter in law helps  Are you currently taking any opioid medications?: No  Can you walk and transfer into and out of your bed and chair?: Yes  Can you dress and groom yourself?: Yes  Can you bathe or shower yourself?: Yes  Can you feed yourself?: Yes  Can you do your laundry/ housekeeping?: Yes  Can you manage your money, pay your bills, and track your expenses?: Yes  Can you make your own meals?: Yes  Can you do your own shopping?: Yes  Within the last 12 months, have you had any hospitalizations or Emergency Department visits?: No  Do you have a living will?: No  Do you have a Durable POA (Power of ) for healthcare decisions?: Yes  Do you have an Advanced Directive for end of life decisions?: No  How often have you used an illegal drug (including marijuana) or a prescription medication for non-medical reasons in the past year?: never  What is the typical number of drinks you consume in a day?: 0  What is the typical number of drinks you consume in a week?: 0  How often did you have a drink containing alcohol in the past year?: never  How many drinks did you have on a typical day  when you were drinking in the past year?: 0  How often did you have 6 or more drinks on one occasion in the past year?: never

## 2024-01-22 NOTE — PROGRESS NOTES
"Answers submitted by the patient for this visit:  Medicare Annual Wellness Visit (Submitted on 1/19/2024)  How would you rate your overall health?: good  Compared to last year, how is your physical health?: same  In general, how satisfied are you with your life?: satisfied  Compared to last year, how is your eyesight?: same  Compared to last year, how is your hearing?: same  Compared to last year, how is your emotional/mental health?: same  How often is anger a problem for you?: never, rarely  How often do you feel unusually tired/fatigued?: sometimes  In the past 7 days, how much pain have you experienced?: some  If you answered \"some\" or \"a lot\", please rate the severity of your pain on a scale of 1 to 10 (1 being the least severe pain and 10 being the most intense pain).: 4/10  In the past 6 months, have you lost or gained 10 pounds without trying?: No  One or more falls in the last year: No  In the past 6 months, have you accidentally leaked urine?: No  Do you have trouble with the stairs inside or outside your home?: No  Does your home have working smoke alarms?: Yes  Does your home have a carbon monoxide monitor?: Yes  Which safety hazards (if any) have you experienced in your home? Please select all that apply.: none  How would you describe your current diet? Please select all that apply.: Regular  In addition to prescription medications, are you taking any over-the-counter supplements?: No  Can you manage your medications?: No  Additional comments : My daughter in law helps  Are you currently taking any opioid medications?: No  Can you walk and transfer into and out of your bed and chair?: Yes  Can you dress and groom yourself?: Yes  Can you bathe or shower yourself?: Yes  Can you feed yourself?: Yes  Can you do your laundry/ housekeeping?: Yes  Can you manage your money, pay your bills, and track your expenses?: Yes  Can you make your own meals?: Yes  Can you do your own shopping?: Yes  Within the last 12 " months, have you had any hospitalizations or Emergency Department visits?: No  Do you have a living will?: No  Do you have a Durable POA (Power of ) for healthcare decisions?: Yes  Do you have an Advanced Directive for end of life decisions?: No  How often have you used an illegal drug (including marijuana) or a prescription medication for non-medical reasons in the past year?: never  What is the typical number of drinks you consume in a day?: 0  What is the typical number of drinks you consume in a week?: 0  How often did you have a drink containing alcohol in the past year?: never  How many drinks did you have on a typical day  when you were drinking in the past year?: 0  How often did you have 6 or more drinks on one occasion in the past year?: never

## 2024-02-08 DIAGNOSIS — R41.3 MEMORY LOSS: ICD-10-CM

## 2024-02-08 RX ORDER — MEMANTINE HYDROCHLORIDE 5 MG/1
5 TABLET ORAL 2 TIMES DAILY
Qty: 180 TABLET | Refills: 3 | Status: SHIPPED | OUTPATIENT
Start: 2024-02-08

## 2024-02-19 ENCOUNTER — OFFICE VISIT (OUTPATIENT)
Dept: INTERNAL MEDICINE CLINIC | Facility: CLINIC | Age: 79
End: 2024-02-19
Payer: COMMERCIAL

## 2024-02-19 VITALS
HEIGHT: 59 IN | HEART RATE: 70 BPM | DIASTOLIC BLOOD PRESSURE: 76 MMHG | OXYGEN SATURATION: 97 % | BODY MASS INDEX: 25.4 KG/M2 | TEMPERATURE: 98.3 F | WEIGHT: 126 LBS | SYSTOLIC BLOOD PRESSURE: 122 MMHG

## 2024-02-19 DIAGNOSIS — E78.2 MIXED HYPERLIPIDEMIA: ICD-10-CM

## 2024-02-19 DIAGNOSIS — L03.031 PARONYCHIA OF TOE OF RIGHT FOOT DUE TO INGROWN TOENAIL: Primary | ICD-10-CM

## 2024-02-19 DIAGNOSIS — L30.9 DERMATITIS: ICD-10-CM

## 2024-02-19 DIAGNOSIS — F34.1 DYSTHYMIC DISORDER: ICD-10-CM

## 2024-02-19 DIAGNOSIS — I10 BENIGN HYPERTENSION: ICD-10-CM

## 2024-02-19 DIAGNOSIS — G30.0 MILD EARLY ONSET ALZHEIMER'S DEMENTIA WITHOUT BEHAVIORAL DISTURBANCE, PSYCHOTIC DISTURBANCE, MOOD DISTURBANCE, OR ANXIETY (HCC): ICD-10-CM

## 2024-02-19 DIAGNOSIS — F02.A0 MILD EARLY ONSET ALZHEIMER'S DEMENTIA WITHOUT BEHAVIORAL DISTURBANCE, PSYCHOTIC DISTURBANCE, MOOD DISTURBANCE, OR ANXIETY (HCC): ICD-10-CM

## 2024-02-19 DIAGNOSIS — L60.0 PARONYCHIA OF TOE OF RIGHT FOOT DUE TO INGROWN TOENAIL: Primary | ICD-10-CM

## 2024-02-19 DIAGNOSIS — E03.9 ACQUIRED HYPOTHYROIDISM: ICD-10-CM

## 2024-02-19 PROCEDURE — 99214 OFFICE O/P EST MOD 30 MIN: CPT | Performed by: INTERNAL MEDICINE

## 2024-02-19 RX ORDER — TRIAMCINOLONE ACETONIDE 1 MG/G
CREAM TOPICAL 2 TIMES DAILY
Qty: 80 G | Refills: 0 | Status: SHIPPED | OUTPATIENT
Start: 2024-02-19

## 2024-02-19 NOTE — PROGRESS NOTES
Assessment/Plan:             1. Paronychia of toe of right foot due to ingrown toenail  -     mupirocin (BACTROBAN) 2 % ointment; Apply topically 3 (three) times a day    2. Dermatitis  -     triamcinolone (KENALOG) 0.1 % cream; Apply topically 2 (two) times a day    3. 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, psychotic disturbance, mood disturbance, or anxiety (HCC)  Comments:  , Hold on Namenda ,continue Aricept    4. Acquired hypothyroidism    5. Mixed hyperlipidemia    6. Dysthymic disorder    7. Benign hypertension  Comments:  stable, same med           Subjective:      Patient ID: Jacklyn Angeles is a 78 y.o. female.    Follow-up on multiple medical problems to ensure they are stable on current medication, Namenda was stopped, no difference,        The following portions of the patient's history were reviewed and updated as appropriate: She  has a past medical history of Anxiety, Arthritis, Breast cancer (HCC), Dementia ([facility]), Depression, Disease of thyroid gland, Diverticulitis of colon, History of chemotherapy, Hypertension, and Urinary incontinence.  She   Patient Active Problem List    Diagnosis Date Noted    Dermatitis 02/19/2024    Paronychia of toe of right foot due to ingrown toenail 02/19/2024    Stroke ([facility]) 09/14/2023    Depression, recurrent ([facility]) 07/17/2023    Medicare annual wellness visit, subsequent 01/16/2023    Dysthymic disorder 01/16/2023    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, psychotic disturbance, mood disturbance, or anxiety ([facility]) 01/16/2023    Moderate dementia ([facility]) 01/10/2023    Rotator cuff arthropathy of both shoulders 11/10/2022    Benign hypertension 11/10/2022    Mixed hyperlipidemia 11/10/2022    Acquired hypothyroidism 11/10/2022    Memory loss 11/10/2022     She  has a past surgical history that includes Breast surgery; Colon surgery; Knee surgery; and Breast lumpectomy (Left).  Her family history includes Dementia in  her mother.  She  reports that she has never smoked. She has never used smokeless tobacco. She reports that she does not drink alcohol and does not use drugs.  Current Outpatient Medications   Medication Sig Dispense Refill    atorvastatin (LIPITOR) 20 mg tablet Take 1 tablet (20 mg total) by mouth daily 180 tablet 0    donepezil (ARICEPT) 10 mg tablet Take 1 tablet (10 mg total) by mouth daily at bedtime 180 tablet 0    irbesartan (AVAPRO) 150 mg tablet Take 1 tablet (150 mg total) by mouth daily at bedtime 180 tablet 0    levothyroxine (Euthyrox) 112 mcg tablet Take 1 tablet (112 mcg total) by mouth daily in the early morning 90 tablet 3    meloxicam (Mobic) 7.5 mg tablet Take 1 tablet (7.5 mg total) by mouth daily 180 tablet 0    mirtazapine (REMERON) 15 mg tablet Take 1 tablet (15 mg total) by mouth daily at bedtime 180 tablet 0    mupirocin (BACTROBAN) 2 % ointment Apply topically 3 (three) times a day 30 g 0    triamcinolone (KENALOG) 0.1 % cream Apply topically 2 (two) times a day 80 g 0    benzonatate (TESSALON PERLES) 100 mg capsule Take 1 capsule (100 mg total) by mouth 3 (three) times a day as needed for cough (Patient not taking: Reported on 2/19/2024) 20 capsule 0    dicyclomine (BENTYL) 20 mg tablet Take 1 tablet (20 mg total) by mouth 2 (two) times a day as needed (abd pain) (Patient not taking: Reported on 2/19/2024) 20 tablet 0    memantine (NAMENDA) 5 mg tablet TAKE 1 TABLET TWICE DAILY (Patient not taking: Reported on 2/19/2024) 180 tablet 3     No current facility-administered medications for this visit.     Current Outpatient Medications on File Prior to Visit   Medication Sig    atorvastatin (LIPITOR) 20 mg tablet Take 1 tablet (20 mg total) by mouth daily    donepezil (ARICEPT) 10 mg tablet Take 1 tablet (10 mg total) by mouth daily at bedtime    irbesartan (AVAPRO) 150 mg tablet Take 1 tablet (150 mg total) by mouth daily at bedtime    levothyroxine (Euthyrox) 112 mcg tablet Take 1 tablet  "(112 mcg total) by mouth daily in the early morning    meloxicam (Mobic) 7.5 mg tablet Take 1 tablet (7.5 mg total) by mouth daily    mirtazapine (REMERON) 15 mg tablet Take 1 tablet (15 mg total) by mouth daily at bedtime    benzonatate (TESSALON PERLES) 100 mg capsule Take 1 capsule (100 mg total) by mouth 3 (three) times a day as needed for cough (Patient not taking: Reported on 2/19/2024)    dicyclomine (BENTYL) 20 mg tablet Take 1 tablet (20 mg total) by mouth 2 (two) times a day as needed (abd pain) (Patient not taking: Reported on 2/19/2024)    memantine (NAMENDA) 5 mg tablet TAKE 1 TABLET TWICE DAILY (Patient not taking: Reported on 2/19/2024)     No current facility-administered medications on file prior to visit.     She has No Known Allergies..    Review of Systems   Constitutional:  Negative for chills and fever.   HENT:  Negative for congestion, ear pain and sore throat.    Eyes:  Negative for pain.   Respiratory:  Negative for cough and shortness of breath.    Cardiovascular:  Negative for chest pain and leg swelling.   Gastrointestinal:  Negative for abdominal pain, nausea and vomiting.   Endocrine: Negative for polyuria.   Genitourinary:  Negative for difficulty urinating, frequency and urgency.   Musculoskeletal:  Negative for arthralgias and back pain.   Skin:  Negative for rash.   Neurological:  Negative for weakness and headaches.   Psychiatric/Behavioral:  Negative for sleep disturbance. The patient is not nervous/anxious.          Objective:      /76 (BP Location: Left arm, Patient Position: Sitting, Cuff Size: Standard)   Pulse 70   Temp 98.3 °F (36.8 °C)   Ht 4' 11\" (1.499 m)   Wt 57.2 kg (126 lb)   SpO2 97%   BMI 25.45 kg/m²     Recent Results (from the past 1344 hour(s))   Comprehensive metabolic panel    Collection Time: 01/20/24 10:54 AM   Result Value Ref Range    Sodium 142 135 - 147 mmol/L    Potassium 4.2 3.5 - 5.3 mmol/L    Chloride 103 96 - 108 mmol/L    CO2 30 21 - 32 " mmol/L    ANION GAP 9 mmol/L    BUN 26 (H) 5 - 25 mg/dL    Creatinine 1.10 0.60 - 1.30 mg/dL    Glucose, Fasting 87 65 - 99 mg/dL    Calcium 9.0 8.4 - 10.2 mg/dL    AST 20 13 - 39 U/L    ALT 11 7 - 52 U/L    Alkaline Phosphatase 81 34 - 104 U/L    Total Protein 6.7 6.4 - 8.4 g/dL    Albumin 4.1 3.5 - 5.0 g/dL    Total Bilirubin 0.54 0.20 - 1.00 mg/dL    eGFR 48 ml/min/1.73sq m   Hemoglobin A1C    Collection Time: 01/20/24 10:54 AM   Result Value Ref Range    Hemoglobin A1C 5.4 Normal 4.0-5.6%; PreDiabetic 5.7-6.4%; Diabetic >=6.5%; Glycemic control for adults with diabetes <7.0% %     mg/dl   Lipid Panel with Direct LDL reflex    Collection Time: 01/20/24 10:54 AM   Result Value Ref Range    Cholesterol 137 See Comment mg/dL    Triglycerides 133 See Comment mg/dL    HDL, Direct 55 >=50 mg/dL    LDL Calculated 55 0 - 100 mg/dL   TSH, 3rd generation    Collection Time: 01/20/24 10:54 AM   Result Value Ref Range    TSH 3RD GENERATON 0.546 0.450 - 4.500 uIU/mL        Physical Exam  Constitutional:       Appearance: Normal appearance.   HENT:      Head: Normocephalic.      Right Ear: External ear normal.      Left Ear: External ear normal.      Nose: Nose normal. No congestion.      Mouth/Throat:      Mouth: Mucous membranes are moist.      Pharynx: Oropharynx is clear. No oropharyngeal exudate or posterior oropharyngeal erythema.   Eyes:      Extraocular Movements: Extraocular movements intact.      Conjunctiva/sclera: Conjunctivae normal.      Pupils: Pupils are equal, round, and reactive to light.   Cardiovascular:      Rate and Rhythm: Normal rate and regular rhythm.      Heart sounds: Normal heart sounds. No murmur heard.  Pulmonary:      Effort: Pulmonary effort is normal.      Breath sounds: Normal breath sounds. No wheezing or rales.   Abdominal:      General: Bowel sounds are normal. There is no distension.      Palpations: Abdomen is soft.      Tenderness: There is no abdominal tenderness.    Musculoskeletal:         General: Normal range of motion.      Cervical back: Normal range of motion and neck supple.      Right lower leg: No edema.      Left lower leg: No edema.   Lymphadenopathy:      Cervical: No cervical adenopathy.   Skin:     General: Skin is warm.   Neurological:      General: No focal deficit present.      Mental Status: She is alert and oriented to person, place, and time.

## 2024-02-21 PROBLEM — Z00.00 MEDICARE ANNUAL WELLNESS VISIT, SUBSEQUENT: Status: RESOLVED | Noted: 2023-01-16 | Resolved: 2024-02-21

## 2024-03-01 ENCOUNTER — TELEPHONE (OUTPATIENT)
Dept: NEUROLOGY | Facility: CLINIC | Age: 79
End: 2024-03-01

## 2024-03-01 NOTE — TELEPHONE ENCOUNTER
Called patient and spoke with daughter in law and confirmed upcoming appointment with Dr. Paredes 3/7.

## 2024-03-07 ENCOUNTER — OFFICE VISIT (OUTPATIENT)
Dept: NEUROLOGY | Facility: CLINIC | Age: 79
End: 2024-03-07
Payer: COMMERCIAL

## 2024-03-07 VITALS
HEART RATE: 64 BPM | SYSTOLIC BLOOD PRESSURE: 130 MMHG | BODY MASS INDEX: 25.8 KG/M2 | DIASTOLIC BLOOD PRESSURE: 80 MMHG | OXYGEN SATURATION: 96 % | WEIGHT: 128 LBS | HEIGHT: 59 IN

## 2024-03-07 DIAGNOSIS — F02.A0 MILD EARLY ONSET ALZHEIMER'S DEMENTIA WITHOUT BEHAVIORAL DISTURBANCE, PSYCHOTIC DISTURBANCE, MOOD DISTURBANCE, OR ANXIETY (HCC): Primary | ICD-10-CM

## 2024-03-07 DIAGNOSIS — G30.0 MILD EARLY ONSET ALZHEIMER'S DEMENTIA WITHOUT BEHAVIORAL DISTURBANCE, PSYCHOTIC DISTURBANCE, MOOD DISTURBANCE, OR ANXIETY (HCC): Primary | ICD-10-CM

## 2024-03-07 PROCEDURE — 99213 OFFICE O/P EST LOW 20 MIN: CPT | Performed by: STUDENT IN AN ORGANIZED HEALTH CARE EDUCATION/TRAINING PROGRAM

## 2024-03-07 NOTE — ASSESSMENT & PLAN NOTE
Initial blood pressure in the 200s sbp after eating before coming into office. Repeat blood pressures afterward at 130/80. Patient following up with PCP about HTN.

## 2024-03-07 NOTE — PROGRESS NOTES
Patient ID: Jacklyn Angeles is a 78 y.o. female.    Assessment/Plan:    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, psychotic disturbance, mood disturbance, or anxiety (HCC)  Patient is a 78 year old woman with hx of cognitive impairment, htn, hld, anxiety/depression presenting for memory impairment followup. Memory stable but namenda made patient more confused and deteriorated mental status. Patient improved off namenda. Patient doing well with current aricept dosing. Memory otherwise stable in short term and long term memory. Continues to not drive and continues to need assistance with finances. However doing fairly well in ADLs as she prepared everything she needs before coming to appointment today according to her daughter.    Workup from previous:  MRI brain neuroquant 12/2023: stable finding from previous with stable hemosiderin deposit on the right frontal lobe compared to previous.  MRI brain neuroquant 7/32/2023 decreased hippocampal occupancy score felt to be more congenital. Also chronic right cerebral infarction with hemosiderin deposition.    Previous MOCA 1/2023: 11/ 30 with particular deficits in recall (0/5), visuospatial, and language     Neuropsych testing 8/14 noted dementia diagnosis multifactorial with anxiety and depression.    Plan:  Staffed with Dr. Armstrong  Continue Aricept 10mg daily  Ok to be off namenda 5mg BID; discontinued order  Patient to followup in 1 year   Patient and daughter agreeable with above.      Benign hypertension  Initial blood pressure in the 200s sbp after eating before coming into office. Repeat blood pressures afterward at 130/80. Patient following up with PCP about HTN.       Diagnoses and all orders for this visit:    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, psychotic disturbance, mood disturbance, or anxiety (HCC)           Subjective:    Patient is a 78 year old woman presenting for followup. Patient previously  "discussed about remeron and about adjusting the medication as patient has difficulty with sleep. Patient placed on aricept and added namenda 5mg BID for memory. Aspirin held previously due to petechial hemorrhages. Namenda subjectively worsened memory. Patient's memory was worsening until patient stopped taking the namenda more than a month. Patient became better in preparing for herself for today's visit. Patient still forgetful but no longer confused. Patient doing with ADLs. Patient continues to not drive and does not do her own finances. Patient has been continued on donepezil 10mg daily. No new vision problems, no new weakness, or gait dysfunction.    Per previous visit:    9/14/2023:  \"Patient's memory has become worse with repetition of questions, stories compared to before. Patient has no trouble taking care of herself and lives with son and daughter in law. Patient is able to take medications by herself. No problems with speech. Patient is on aricept 10mg daily with no problems. Patient followed with Neuropsychology who states her memory problems are mulitfactorial from dementia and anxiety/depression. Patient does not drive. Patient continues to need assistance with finances. Otherwise no numbness, no weakness, no visual deficits, no ambulatory dysfunction.     Patient has had trouble driving in [place].\"      1/10/2023:  \"Memory issues unclear when first noticed. More long term memory rather than short term memory. Patient when cannot remember she starts hitting her head due to being in distress. She has times she forgot her coat. She forgets keep things secret and then when she was told that, she becomes distressed. Memory problem first noticed by patient but she is unsure when memory problems started. Daughter in law suspects possibly in a couple years. Memory loss seems gradual but there is paucity of information due to living with daughter in the past. Patient has difficulty with memory from the past " "like where she lived in the past and her family information from the past such as dates and when she moved. Memory has been improving according to daughter in law especially after primary doctor removed the sertraline 50mg off of the medication list. Patient is fluent in speech with no word finding difficulties. She does not drive but when she was in [place] she drove but had times when she was on a familiar road she did not know where she is. She does have one time when she did not where she was and in [place] she did have 1-2 times when she was lost in her house. She had a time when she gave personal information to an unknown person online but she then realized what she did and then become emotionally distressed but unsure if patient makes stories up as well. Family assists for her financial capabilities, because she has episodes that seems like patient is confabulating for attention. According to daughter in law, patient does have emotional instability due to missing friends and family from [place] and is now in a new location. She doesn't need a walker or cane and have had no falls. No confusion, no seizures, no staring spells. Patient sleeping well after placing patient on remeron for sleep. Patient has polyuria which has been controlled by her medicine doctor and can control when she needs to urinate. Denies hallucinations.      Mother and aunt may have had Alzheimer's in the past.     Also states having shaking of both hands when she's anxious or when talking to her daughter and does not have these shaking otherwise. No pacing and no agitated behavior. No suspicious behaviors, feeling withdrawn.     She has some numbness of the hands bilaterally intermittently which are occasional and no weakness. No vision problems. Patient says she has pain on back of the head which resolves with massaging of the head.\"          The following portions of the patient's history were reviewed and updated as appropriate: She  " has a past medical history of Anxiety, Arthritis, Breast cancer ([facility]), Dementia ([facility]), Depression, Disease of thyroid gland, Diverticulitis of colon, History of chemotherapy, Hypertension, and Urinary incontinence.  She   Patient Active Problem List    Diagnosis Date Noted    Dermatitis 02/19/2024    Paronychia of toe of right foot due to ingrown toenail 02/19/2024    Stroke ([facility]) 09/14/2023    Depression, recurrent (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) 01/16/2023    Moderate dementia ([facility]) 01/10/2023    Rotator cuff arthropathy of both shoulders 11/10/2022    Benign hypertension 11/10/2022    Mixed hyperlipidemia 11/10/2022    Acquired hypothyroidism 11/10/2022    Memory loss 11/10/2022     She  has a past surgical history that includes Breast surgery; Colon surgery; Knee surgery; and Breast lumpectomy (Left).  Her family history includes Dementia in her mother.  She  reports that she has never smoked. She has never used smokeless tobacco. She reports that she does not drink alcohol and does not use drugs.  Current Outpatient Medications   Medication Sig Dispense Refill    atorvastatin (LIPITOR) 20 mg tablet Take 1 tablet (20 mg total) by mouth daily 180 tablet 0    donepezil (ARICEPT) 10 mg tablet Take 1 tablet (10 mg total) by mouth daily at bedtime 180 tablet 0    irbesartan (AVAPRO) 150 mg tablet Take 1 tablet (150 mg total) by mouth daily at bedtime 180 tablet 0    meloxicam (Mobic) 7.5 mg tablet Take 1 tablet (7.5 mg total) by mouth daily 180 tablet 0    mirtazapine (REMERON) 15 mg tablet Take 1 tablet (15 mg total) by mouth daily at bedtime 180 tablet 0    mupirocin (BACTROBAN) 2 % ointment Apply topically 3 (three) times a day 30 g 0    triamcinolone (KENALOG) 0.1 % cream Apply topically 2 (two) times a day 80 g 0    benzonatate (TESSALON PERLES) 100 mg capsule Take  "1 capsule (100 mg total) by mouth 3 (three) times a day as needed for cough (Patient not taking: Reported on 2/19/2024) 20 capsule 0    dicyclomine (BENTYL) 20 mg tablet Take 1 tablet (20 mg total) by mouth 2 (two) times a day as needed (abd pain) (Patient not taking: Reported on 2/19/2024) 20 tablet 0    levothyroxine (Euthyrox) 112 mcg tablet Take 1 tablet (112 mcg total) by mouth daily in the early morning 90 tablet 3     No current facility-administered medications for this visit.     Current Outpatient Medications on File Prior to Visit   Medication Sig    atorvastatin (LIPITOR) 20 mg tablet Take 1 tablet (20 mg total) by mouth daily    donepezil (ARICEPT) 10 mg tablet Take 1 tablet (10 mg total) by mouth daily at bedtime    irbesartan (AVAPRO) 150 mg tablet Take 1 tablet (150 mg total) by mouth daily at bedtime    meloxicam (Mobic) 7.5 mg tablet Take 1 tablet (7.5 mg total) by mouth daily    mirtazapine (REMERON) 15 mg tablet Take 1 tablet (15 mg total) by mouth daily at bedtime    mupirocin (BACTROBAN) 2 % ointment Apply topically 3 (three) times a day    triamcinolone (KENALOG) 0.1 % cream Apply topically 2 (two) times a day    benzonatate (TESSALON PERLES) 100 mg capsule Take 1 capsule (100 mg total) by mouth 3 (three) times a day as needed for cough (Patient not taking: Reported on 2/19/2024)    dicyclomine (BENTYL) 20 mg tablet Take 1 tablet (20 mg total) by mouth 2 (two) times a day as needed (abd pain) (Patient not taking: Reported on 2/19/2024)    levothyroxine (Euthyrox) 112 mcg tablet Take 1 tablet (112 mcg total) by mouth daily in the early morning    [DISCONTINUED] memantine (NAMENDA) 5 mg tablet TAKE 1 TABLET TWICE DAILY (Patient not taking: Reported on 2/19/2024)     No current facility-administered medications on file prior to visit.     She has No Known Allergies..         Objective:    Blood pressure 130/80, pulse 64, height 4' 11\" (1.499 m), weight 58.1 kg (128 lb), SpO2 96%.    Physical " Exam  Constitutional:       General: She is awake.   Eyes:      Extraocular Movements: Extraocular movements intact.   Neurological:      Motor: Motor strength is normal.     Deep Tendon Reflexes: Reflexes are normal and symmetric.   Psychiatric:         Speech: Speech normal.         Neurological Exam  Mental Status  Awake. Oriented only to person and place. Speech is normal. Language is fluent with no aphasia. Attention and concentration are normal.  MOCA 14/30 with difficulties with visuospatial, delayed recall.    Cranial Nerves  CN II: Visual fields full to confrontation.  CN III, IV, VI: Extraocular movements intact bilaterally.  CN V: Facial sensation is normal.  CN VII: Full and symmetric facial movement.  CN XI: Shoulder shrug strength is normal.  CN XII: Tongue midline without atrophy or fasciculations.    Motor   No abnormal involuntary movements. Strength is 5/5 throughout all four extremities.    Sensory  Light touch is normal in upper and lower extremities.     Reflexes  Deep tendon reflexes are 2+ and symmetric in all four extremities.    Coordination  Right: Finger-to-nose normal.Left: Finger-to-nose normal.    Gait  Casual gait is normal including stance, stride, and arm swing.        ROS:    Review of Systems   Constitutional:  Negative for fatigue.   HENT:  Negative for trouble swallowing.    Respiratory:  Negative for chest tightness and shortness of breath.    Cardiovascular:  Negative for chest pain.   Gastrointestinal:  Negative for nausea and vomiting.   Endocrine: Negative for polyuria.   Genitourinary:  Negative for dysuria.   Musculoskeletal:  Negative for gait problem.   Neurological:  Negative for weakness and numbness.   Psychiatric/Behavioral:  Negative for behavioral problems. The patient is nervous/anxious.

## 2024-03-07 NOTE — ASSESSMENT & PLAN NOTE
Patient is a 78 year old woman with hx of cognitive impairment, htn, hld, anxiety/depression presenting for memory impairment followup. Memory stable but namenda made patient more confused and deteriorated mental status. Patient improved off namenda. Patient doing well with current aricept dosing. Memory otherwise stable in short term and long term memory. Continues to not drive and continues to need assistance with finances. However doing fairly well in ADLs as she prepared everything she needs before coming to appointment today according to her daughter.    Workup from previous:  MRI brain neuroquant 12/2023: stable finding from previous with stable hemosiderin deposit on the right frontal lobe compared to previous.  MRI brain neuroquant 7/32/2023 decreased hippocampal occupancy score felt to be more congenital. Also chronic right cerebral infarction with hemosiderin deposition.    Previous MOCA 1/2023: 11/ 30 with particular deficits in recall (0/5), visuospatial, and language     Neuropsych testing 8/14 noted dementia diagnosis multifactorial with anxiety and depression.    Plan:  Staffed with Dr. Armstrong  Continue Aricept 10mg daily  Ok to be off namenda 5mg BID; discontinued order  Patient to followup in 1 year   Patient and daughter agreeable with above.

## 2024-04-19 ENCOUNTER — NURSE TRIAGE (OUTPATIENT)
Dept: OTHER | Facility: OTHER | Age: 79
End: 2024-04-19

## 2024-04-19 DIAGNOSIS — F02.A0 MILD EARLY ONSET ALZHEIMER'S DEMENTIA WITHOUT BEHAVIORAL DISTURBANCE, PSYCHOTIC DISTURBANCE, MOOD DISTURBANCE, OR ANXIETY (HCC): ICD-10-CM

## 2024-04-19 DIAGNOSIS — F34.1 DYSTHYMIC DISORDER: ICD-10-CM

## 2024-04-19 DIAGNOSIS — M12.811 ROTATOR CUFF ARTHROPATHY OF BOTH SHOULDERS: ICD-10-CM

## 2024-04-19 DIAGNOSIS — E78.2 MIXED HYPERLIPIDEMIA: ICD-10-CM

## 2024-04-19 DIAGNOSIS — M12.812 ROTATOR CUFF ARTHROPATHY OF BOTH SHOULDERS: ICD-10-CM

## 2024-04-19 DIAGNOSIS — I10 BENIGN HYPERTENSION: ICD-10-CM

## 2024-04-19 DIAGNOSIS — R10.84 GENERALIZED ABDOMINAL PAIN: ICD-10-CM

## 2024-04-19 DIAGNOSIS — G30.0 MILD EARLY ONSET ALZHEIMER'S DEMENTIA WITHOUT BEHAVIORAL DISTURBANCE, PSYCHOTIC DISTURBANCE, MOOD DISTURBANCE, OR ANXIETY (HCC): ICD-10-CM

## 2024-04-19 RX ORDER — MIRTAZAPINE 15 MG/1
15 TABLET, FILM COATED ORAL
Qty: 7 TABLET | Refills: 0 | Status: SHIPPED | OUTPATIENT
Start: 2024-04-19 | End: 2024-04-19

## 2024-04-19 RX ORDER — IRBESARTAN 150 MG/1
150 TABLET ORAL
Qty: 7 TABLET | Refills: 0 | Status: SHIPPED | OUTPATIENT
Start: 2024-04-19 | End: 2024-04-19 | Stop reason: SDUPTHER

## 2024-04-19 RX ORDER — MIRTAZAPINE 15 MG/1
15 TABLET, FILM COATED ORAL
Qty: 7 TABLET | Refills: 0 | Status: SHIPPED | OUTPATIENT
Start: 2024-04-19 | End: 2024-04-19 | Stop reason: SDUPTHER

## 2024-04-19 RX ORDER — IRBESARTAN 150 MG/1
150 TABLET ORAL
Qty: 7 TABLET | Refills: 0 | Status: SHIPPED | OUTPATIENT
Start: 2024-04-19 | End: 2024-04-19

## 2024-04-20 NOTE — TELEPHONE ENCOUNTER
"Reason for Disposition  • [1] Caller requesting a prescription renewal (no refills left), no triage required, AND [2] triager able to renew prescription per department policy    Answer Assessment - Initial Assessment Questions  1. DRUG NAME: \"What medicine do you need to have refilled?\"     irbesartan (AVAPRO) 150 mg tablet and mirtazapine (REMERON),  2. REFILLS REMAINING: \"How many refills are remaining?\" (Note: The label on the medicine or pill bottle will show how many refills are remaining. If there are no refills remaining, then a renewal may be needed.)      N/a  3. EXPIRATION DATE: \"What is the expiration date?\" (Note: The label states when the prescription will , and thus can no longer be refilled.)     N/a  4. PRESCRIBING HCP: \"Who prescribed it?\" Reason: If prescribed by specialist, call should be referred to that group.      PCP  5. SYMPTOMS: \"Do you have any symptoms?\"      Denies    Protocols used: Medication Refill and Renewal Call-ADULT-    "

## 2024-04-20 NOTE — TELEPHONE ENCOUNTER
"Regarding: Medication refill  ----- Message from Sesar Hawthorne sent at 4/19/2024  8:01 PM EDT -----  \"My mother in law is out of irbesartan (AVAPRO) 150 mg tablet and mirtazapine (REMERON), and needs a refill send to [facility]/pharmacy #[id number] - [place], [place] - [address] [place] [address]\"    "

## 2024-04-22 RX ORDER — ATORVASTATIN CALCIUM 20 MG/1
20 TABLET, FILM COATED ORAL DAILY
Qty: 180 TABLET | Refills: 0 | Status: SHIPPED | OUTPATIENT
Start: 2024-04-22 | End: 2024-04-25 | Stop reason: SDUPTHER

## 2024-04-22 RX ORDER — MELOXICAM 7.5 MG/1
7.5 TABLET ORAL DAILY
Qty: 180 TABLET | Refills: 0 | Status: SHIPPED | OUTPATIENT
Start: 2024-04-22 | End: 2024-04-25 | Stop reason: SDUPTHER

## 2024-04-22 RX ORDER — DICYCLOMINE HCL 20 MG
20 TABLET ORAL 2 TIMES DAILY PRN
Qty: 20 TABLET | Refills: 0 | Status: SHIPPED | OUTPATIENT
Start: 2024-04-22

## 2024-04-22 RX ORDER — IRBESARTAN 150 MG/1
150 TABLET ORAL
Qty: 30 TABLET | Refills: 0 | Status: SHIPPED | OUTPATIENT
Start: 2024-04-22 | End: 2024-04-25 | Stop reason: SDUPTHER

## 2024-04-22 RX ORDER — MIRTAZAPINE 15 MG/1
15 TABLET, FILM COATED ORAL
Qty: 30 TABLET | Refills: 0 | Status: SHIPPED | OUTPATIENT
Start: 2024-04-22 | End: 2024-04-25 | Stop reason: SDUPTHER

## 2024-04-22 RX ORDER — DONEPEZIL HYDROCHLORIDE 10 MG/1
10 TABLET, FILM COATED ORAL
Qty: 180 TABLET | Refills: 0 | Status: SHIPPED | OUTPATIENT
Start: 2024-04-22 | End: 2024-04-25 | Stop reason: SDUPTHER

## 2024-04-25 ENCOUNTER — OFFICE VISIT (OUTPATIENT)
Dept: INTERNAL MEDICINE CLINIC | Facility: CLINIC | Age: 79
End: 2024-04-25
Payer: COMMERCIAL

## 2024-04-25 VITALS
SYSTOLIC BLOOD PRESSURE: 136 MMHG | WEIGHT: 124 LBS | TEMPERATURE: 97.4 F | HEIGHT: 59 IN | HEART RATE: 72 BPM | DIASTOLIC BLOOD PRESSURE: 74 MMHG | BODY MASS INDEX: 25 KG/M2

## 2024-04-25 DIAGNOSIS — I10 BENIGN HYPERTENSION: Primary | ICD-10-CM

## 2024-04-25 DIAGNOSIS — M12.811 ROTATOR CUFF ARTHROPATHY OF BOTH SHOULDERS: ICD-10-CM

## 2024-04-25 DIAGNOSIS — E78.2 MIXED HYPERLIPIDEMIA: ICD-10-CM

## 2024-04-25 DIAGNOSIS — E03.9 ACQUIRED HYPOTHYROIDISM: ICD-10-CM

## 2024-04-25 DIAGNOSIS — M12.812 ROTATOR CUFF ARTHROPATHY OF BOTH SHOULDERS: ICD-10-CM

## 2024-04-25 DIAGNOSIS — F02.A0 MILD EARLY ONSET ALZHEIMER'S DEMENTIA WITHOUT BEHAVIORAL DISTURBANCE, PSYCHOTIC DISTURBANCE, MOOD DISTURBANCE, OR ANXIETY (HCC): ICD-10-CM

## 2024-04-25 DIAGNOSIS — R73.01 IMPAIRED FASTING BLOOD SUGAR: ICD-10-CM

## 2024-04-25 DIAGNOSIS — G30.0 MILD EARLY ONSET ALZHEIMER'S DEMENTIA WITHOUT BEHAVIORAL DISTURBANCE, PSYCHOTIC DISTURBANCE, MOOD DISTURBANCE, OR ANXIETY (HCC): ICD-10-CM

## 2024-04-25 DIAGNOSIS — F34.1 DYSTHYMIC DISORDER: ICD-10-CM

## 2024-04-25 PROCEDURE — 99214 OFFICE O/P EST MOD 30 MIN: CPT | Performed by: INTERNAL MEDICINE

## 2024-04-25 PROCEDURE — G2211 COMPLEX E/M VISIT ADD ON: HCPCS | Performed by: INTERNAL MEDICINE

## 2024-04-25 RX ORDER — MIRTAZAPINE 15 MG/1
15 TABLET, FILM COATED ORAL
Qty: 90 TABLET | Refills: 1 | Status: SHIPPED | OUTPATIENT
Start: 2024-04-25

## 2024-04-25 RX ORDER — MELOXICAM 7.5 MG/1
7.5 TABLET ORAL DAILY
Qty: 180 TABLET | Refills: 0 | Status: SHIPPED | OUTPATIENT
Start: 2024-04-25

## 2024-04-25 RX ORDER — DONEPEZIL HYDROCHLORIDE 10 MG/1
10 TABLET, FILM COATED ORAL
Qty: 180 TABLET | Refills: 0 | Status: SHIPPED | OUTPATIENT
Start: 2024-04-25

## 2024-04-25 RX ORDER — ATORVASTATIN CALCIUM 20 MG/1
20 TABLET, FILM COATED ORAL DAILY
Qty: 180 TABLET | Refills: 0 | Status: SHIPPED | OUTPATIENT
Start: 2024-04-25

## 2024-04-25 RX ORDER — IRBESARTAN 150 MG/1
150 TABLET ORAL
Qty: 90 TABLET | Refills: 0 | Status: SHIPPED | OUTPATIENT
Start: 2024-04-25

## 2024-04-25 RX ORDER — LEVOTHYROXINE SODIUM 112 UG/1
112 TABLET ORAL
Qty: 90 TABLET | Refills: 3 | Status: SHIPPED | OUTPATIENT
Start: 2024-04-25

## 2024-04-25 NOTE — PROGRESS NOTES
Assessment/Plan:             1. Benign hypertension  Comments:  Stable,  continue same medication  Orders:  -     irbesartan (AVAPRO) 150 mg tablet; Take 1 tablet (150 mg total) by mouth daily at bedtime    2. Mixed hyperlipidemia  -     atorvastatin (LIPITOR) 20 mg tablet; Take 1 tablet (20 mg total) by mouth daily  -     Comprehensive metabolic panel; Future  -     Lipid Panel with Direct LDL reflex; Future  -     TSH, 3rd generation; Future    3. 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 Acquired hypothyroidism  -     levothyroxine (Euthyrox) 112 mcg tablet; Take 1 tablet (112 mcg total) by mouth daily in the early morning  -     Comprehensive metabolic panel; Future  -     Lipid Panel with Direct LDL reflex; Future  -     TSH, 3rd generation; Future    5. Rotator cuff arthropathy of both shoulders  Comments:  Exercise for shoulders discussed with her  Orders:  -     meloxicam (Mobic) 7.5 mg tablet; Take 1 tablet (7.5 mg total) by mouth daily    6. Dysthymic disorder  -     mirtazapine (REMERON) 15 mg tablet; Take 1 tablet (15 mg total) by mouth daily at bedtime    7. Impaired fasting blood sugar  -     CBC and differential; Future  -     Hemoglobin A1C; Future           Subjective:      Patient ID: Jacklyn Angeles is a 78 y.o. female.    Follow-up on multiple medical problems to ensure they are stable on current medications        The following portions of the patient's history were reviewed and updated as appropriate: She  has a past medical history of Anxiety, Arthritis, Breast cancer (HCC), Dementia (HCC), Depression, Disease of thyroid gland, Diverticulitis of colon, History of chemotherapy, Hypertension, and Urinary incontinence.  She   Patient Active Problem List     Diagnosis Date Noted    Dermatitis 02/19/2024    Paronychia of toe of right foot due to ingrown toenail 02/19/2024    Stroke ([facility]) 09/14/2023    Depression, recurrent ([facility]) 07/17/2023    Dysthymic disorder 01/16/2023    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, psychotic disturbance, mood disturbance, or anxiety ([facility]) 01/16/2023    Moderate dementia ([facility]) 01/10/2023    Rotator cuff arthropathy of both shoulders 11/10/2022    Benign hypertension 11/10/2022    Mixed hyperlipidemia 11/10/2022    Acquired hypothyroidism 11/10/2022    Memory loss 11/10/2022     She  has a past surgical history that includes Breast surgery; Colon surgery; Knee surgery; and Breast lumpectomy (Left).  Her family history includes Dementia in her mother.  She  reports that she has never smoked. She has never used smokeless tobacco. She reports that she does not drink alcohol and does not use drugs.  Current Outpatient Medications   Medication Sig Dispense Refill    atorvastatin (LIPITOR) 20 mg tablet Take 1 tablet (20 mg total) by mouth daily 180 tablet 0    dicyclomine (BENTYL) 20 mg tablet Take 1 tablet (20 mg total) by mouth 2 (two) times a day as needed (abd pain) 20 tablet 0    donepezil (ARICEPT) 10 mg tablet Take 1 tablet (10 mg total) by mouth daily at bedtime 180 tablet 0    irbesartan (AVAPRO) 150 mg tablet Take 1 tablet (150 mg total) by mouth daily at bedtime 90 tablet 0    levothyroxine (Euthyrox) 112 mcg tablet Take 1 tablet (112 mcg total) by mouth daily in the early morning 90 tablet 3    meloxicam (Mobic) 7.5 mg tablet Take 1 tablet (7.5 mg total) by mouth daily 180 tablet 0    mirtazapine (REMERON) 15 mg tablet Take 1 tablet (15 mg total) by mouth daily at bedtime 90 tablet 1    mupirocin (BACTROBAN) 2 % ointment Apply topically 3 (three) times a day 30 g 0    triamcinolone (KENALOG) 0.1 % cream Apply topically 2 (two) times a day 80 g 0    benzonatate (TESSALON PERLES) 100 mg capsule Take 1  capsule (100 mg total) by mouth 3 (three) times a day as needed for cough (Patient not taking: Reported on 2/19/2024) 20 capsule 0     No current facility-administered medications for this visit.     Current Outpatient Medications on File Prior to Visit   Medication Sig    dicyclomine (BENTYL) 20 mg tablet Take 1 tablet (20 mg total) by mouth 2 (two) times a day as needed (abd pain)    mupirocin (BACTROBAN) 2 % ointment Apply topically 3 (three) times a day    triamcinolone (KENALOG) 0.1 % cream Apply topically 2 (two) times a day    [DISCONTINUED] atorvastatin (LIPITOR) 20 mg tablet Take 1 tablet (20 mg total) by mouth daily    [DISCONTINUED] donepezil (ARICEPT) 10 mg tablet Take 1 tablet (10 mg total) by mouth daily at bedtime    [DISCONTINUED] irbesartan (AVAPRO) 150 mg tablet Take 1 tablet (150 mg total) by mouth daily at bedtime    [DISCONTINUED] levothyroxine (Euthyrox) 112 mcg tablet Take 1 tablet (112 mcg total) by mouth daily in the early morning    [DISCONTINUED] meloxicam (Mobic) 7.5 mg tablet Take 1 tablet (7.5 mg total) by mouth daily    [DISCONTINUED] mirtazapine (REMERON) 15 mg tablet Take 1 tablet (15 mg total) by mouth daily at bedtime    benzonatate (TESSALON PERLES) 100 mg capsule Take 1 capsule (100 mg total) by mouth 3 (three) times a day as needed for cough (Patient not taking: Reported on 2/19/2024)     No current facility-administered medications on file prior to visit.     She has No Known Allergies..    Review of Systems   Constitutional:  Negative for chills and fever.   HENT:  Negative for congestion, ear pain and sore throat.    Eyes:  Negative for pain.   Respiratory:  Negative for cough and shortness of breath.    Cardiovascular:  Negative for chest pain and leg swelling.   Gastrointestinal:  Negative for abdominal pain, nausea and vomiting.   Endocrine: Negative for polyuria.   Genitourinary:  Negative for difficulty urinating, frequency and urgency.   Musculoskeletal:  Positive for  "arthralgias. Negative for back pain.   Skin:  Negative for rash.   Neurological:  Negative for weakness and headaches.   Psychiatric/Behavioral:  Negative for sleep disturbance. The patient is not nervous/anxious.          Objective:      /74 (BP Location: Left arm, Patient Position: Sitting, Cuff Size: Standard)   Pulse 72 Comment: unable  Temp (!) 97.4 °F (36.3 °C) (Temporal)   Ht 4' 11\" (1.499 m)   Wt 56.2 kg (124 lb)   BMI 25.04 kg/m²     No results found for this or any previous visit (from the past 1344 hour(s)).     Physical Exam  Constitutional:       Appearance: Normal appearance.   HENT:      Head: Normocephalic.      Right Ear: External ear normal.      Left Ear: External ear normal.      Nose: Nose normal. No congestion.      Mouth/Throat:      Mouth: Mucous membranes are moist.      Pharynx: Oropharynx is clear. No oropharyngeal exudate or posterior oropharyngeal erythema.   Eyes:      Extraocular Movements: Extraocular movements intact.      Conjunctiva/sclera: Conjunctivae normal.   Cardiovascular:      Rate and Rhythm: Normal rate and regular rhythm.      Heart sounds: Normal heart sounds. No murmur heard.  Pulmonary:      Effort: Pulmonary effort is normal.      Breath sounds: Normal breath sounds. No wheezing or rales.   Abdominal:      General: Abdomen is flat. There is no distension.      Palpations: Abdomen is soft.      Tenderness: There is no abdominal tenderness.   Musculoskeletal:      Cervical back: Normal range of motion and neck supple.      Right lower leg: No edema.      Left lower leg: No edema.   Lymphadenopathy:      Cervical: No cervical adenopathy.   Skin:     General: Skin is warm.   Neurological:      General: No focal deficit present.      Mental Status: She is alert.         "

## 2024-05-31 DIAGNOSIS — L30.9 DERMATITIS: ICD-10-CM

## 2024-06-03 RX ORDER — TRIAMCINOLONE ACETONIDE 1 MG/G
CREAM TOPICAL 2 TIMES DAILY
Qty: 80 G | Refills: 0 | Status: SHIPPED | OUTPATIENT
Start: 2024-06-03

## 2024-06-11 ENCOUNTER — HOSPITAL ENCOUNTER (OUTPATIENT)
Dept: RADIOLOGY | Facility: HOSPITAL | Age: 79
Discharge: HOME | End: 2024-06-11
Payer: COMMERCIAL

## 2024-06-11 DIAGNOSIS — F02.A0 MILD EARLY ONSET ALZHEIMER'S DEMENTIA WITHOUT BEHAVIORAL DISTURBANCE, PSYCHOTIC DISTURBANCE, MOOD DISTURBANCE, OR ANXIETY (HCC): ICD-10-CM

## 2024-06-11 DIAGNOSIS — G30.0 MILD EARLY ONSET ALZHEIMER'S DEMENTIA WITHOUT BEHAVIORAL DISTURBANCE, PSYCHOTIC DISTURBANCE, MOOD DISTURBANCE, OR ANXIETY (HCC): ICD-10-CM

## 2024-06-11 PROCEDURE — 70551 MRI BRAIN STEM W/O DYE: CPT

## 2024-06-11 PROCEDURE — G1004 CDSM NDSC: HCPCS

## 2024-06-20 ENCOUNTER — TELEPHONE (OUTPATIENT)
Dept: NEUROLOGY | Facility: CLINIC | Age: 79
End: 2024-06-20

## 2024-06-20 DIAGNOSIS — F02.A0 MILD EARLY ONSET ALZHEIMER'S DEMENTIA WITHOUT BEHAVIORAL DISTURBANCE, PSYCHOTIC DISTURBANCE, MOOD DISTURBANCE, OR ANXIETY (HCC): Primary | ICD-10-CM

## 2024-06-20 DIAGNOSIS — G30.0 MILD EARLY ONSET ALZHEIMER'S DEMENTIA WITHOUT BEHAVIORAL DISTURBANCE, PSYCHOTIC DISTURBANCE, MOOD DISTURBANCE, OR ANXIETY (HCC): Primary | ICD-10-CM

## 2024-06-20 NOTE — TELEPHONE ENCOUNTER
Attempted to call patient and daughter in law X2 and left a message about update of MRI brain findings. 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

## 2024-06-20 NOTE — TELEPHONE ENCOUNTER
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    Offered multiple modalities of supportive care for Alzheimer's. Patient currently on Aricept 10mg daily. I talked that we can increase aricept to 20mg daily but would need to talk about risk factors including cardiac and GI side effects. After weighing decision based on above, daughter in law want to hold off.    Also recommended lifestyle modifications such as balanced diet, regular exercise, doing mentally stimulating activities, and socializations. Daughter in law agreeable.    Also recommended cognitive therapy which can be done to help with the mind. Daughter in law agreeable.    Also talked about future care of patient and need of assistance in the future. Daughter in law at this time currently managing patient's care well.    Patient appreciative of call and had no further questions.

## 2024-06-20 NOTE — TELEPHONE ENCOUNTER
6/20 2:02    Pt's DIL left a VM returning Dr. Paredes's call.    Transcribed VM:   Hi, my name is Diana. I am calling for Jacklyn Angeles. Dr. Paredes had just called me and left me a message saying for me to call back. You can call me back at [phone]. Thank you.

## 2024-06-23 NOTE — PROGRESS NOTES
"Speech-Language Pathology Initial Evaluation    Today's date: 2024  Patient’s name: Jacklyn Angeles  : 1945  MRN: [id number]  Safety measures: h/o CVA, HTN, dementia, depression, anxiety  Referring provider: Cristian Paredes MD    Encounter Diagnosis     ICD-10-CM    1. Other symbolic dysfunctions  R48.8       2. 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, psychotic disturbance, mood disturbance, or anxiety ([facility])  G30.0 Ambulatory Referral to Speech Therapy    F02.A0           Assessment:  Patient presents with at least moderate cognitive-linguistic deficits c/b reduced orientation, memory (both short-term and long-term recall), attention, concentration, word finding, direction following, processing, executive functioning, and visuospatial skills. Patient was oriented to the year and day of the week, as well as to the state and hospital network. Difficulties noted with recall of month, date, and city. Patient demonstrated strengths in immediate verbal recall (4/4 words recalled), visual naming (3/3 photos named), mental control (counting down from 20-1, and reciting the CARMINE in reverse order), repetition of sentence, and verbal fluency (15+ girl names in 60 sec). Patient demonstrated difficulties with attention (identification of letter \"C\" from a list presented aloud), digit recall (1/2 points achieved for forward, 1/2 points achieved for backward), abstraction (1/3 similarities stated), executive functioning (cognitive shifting and judgement difficulties), visuospatial (design copying & clock drawing difficulties), delayed verbal recall (1/4 words recalled), immediate story recall (6/11 details recalled), delayed visual recall (0/3 photos recalled), delayed story recall (0/11 details recalled), and delayed story recognition (1/5 details recalled). During today's evaluation, patient appeared to be anxious, demonstrated reduced insight into her deficits, and had difficulty with " direction following in her primary/preferred language of Cypriot. Due to patient's known diagnosis of dementia, and the mentioned observations, it is recommended that patient be transferred to the [facility] for continuation of outpatient skilled Speech Therapy services under the direction of a speech-language pathologist who is fluent in Cypriot. Patient's caregivers were in agreement with this recommendation. Clinician will aid with this transfer by collaborating with the [facility] SLP. Patient may also benefit from a referral to behavioral health services as well due to her history of depression and anxiety.      Short-term goals:  Patient and caregiver(s) will be educated on dementia and related topics for improved prognosis of care (to be achieved in 4-6 weeks).     Patient will participate in continued therapeutic trials in 90% of opportunities for continued assessment and recommendations for carryover and functional tasks at home (to be achieved in 4-6 weeks).     Patient and caregiver(s) will participate in the creation and use of a memory book with carryover in 4 out of 5 opportunities to facilitate improved overall function and quality of life (to be achieved in 4-6 weeks).     Patient will improve long-term memory retrieval and recall of information during reminiscing tasks with 80% accuracy provided external memory aid (to be achieved in 4-6 weeks).     Long-term goals:  Patient will improve functional cognitive-linguistic skills with the implementation of cues and external aids (to be achieved by discharge).     Patient will demonstrate adequate memory/reasoning/judgment to perform desired activities in a supervised environment (to be achieved by discharge).     Patient's caregiver(s) will demonstrate implementation of memory book/aids into daily activities to assist with ADLs and improve quality of life (to be achieved by discharge).       Plan:  Patient would benefit from outpatient  "skilled Speech Therapy services: Cognitive-linguistic therapy    Frequency: 1-2x weekly  Duration: 3 months    Intervention certification from: 6/26/2024  Intervention certification to: 09/26/2024      Subjective:  History of present illness: Patient is a 79 y.o. female who was referred to outpatient skilled Speech Therapy services for a cognitive-linguistic evaluation. Patient with a significant PMH of CVA, HTN, HLD, depression, anxiety, dementia, arthritis, diverticulitis. Patient currently follows with Neurology. Per chart review,     \"01/10/2023: Memory issues unclear when first noticed. More long term memory rather than short term memory. Patient when cannot remember she starts hitting her head due to being in distress. She has times she forgot her coat. She forgets keep things secret and then when she was told that, she becomes distressed. Memory problem first noticed by patient but she is unsure when memory problems started. Daughter in law suspects possibly in a couple years. Memory loss seems gradual but there is paucity of information due to living with daughter in the past. Patient has difficulty with memory from the past like where she lived in the past and her family information from the past such as dates and when she moved. Memory has been improving according to daughter in law especially after primary doctor removed the sertraline 50mg off of the medication list. Patient is fluent in speech with no word finding difficulties. She does not drive but when she was in [place] she drove but had times when she was on a familiar road she did not know where she is. She does have one time when she did not where she was and in [place] she did have 1-2 times when she was lost in her house. She had a time when she gave personal information to an unknown person online but she then realized what she did and then become emotionally distressed but unsure if patient makes stories up as well. Family assists for her " financial capabilities, because she has episodes that seems like patient is confabulating for attention. According to daughter in law, patient does have emotional instability due to missing friends and family from [place] and is now in a new location. She doesn't need a walker or cane and have had no falls. No confusion, no seizures, no staring spells. Patient sleeping well after placing patient on remeron for sleep. Patient has polyuria which has been controlled by her medicine doctor and can control when she needs to urinate. Denies hallucinations. Mother and aunt may have had Alzheimer's in the past. Also states having shaking of both hands when she's anxious or when talking to her daughter and does not have these shaking otherwise. No pacing and no agitated behavior. No suspicious behaviors, feeling withdrawn. She has some numbness of the hands bilaterally intermittently which are occasional and no weakness. No vision problems. Patient says she has pain on back of the head which resolves with massaging of the head.    09/14/2023: Patient's memory has become worse with repetition of questions, stories compared to before. Patient has no trouble taking care of herself and lives with son and daughter in law. Patient is able to take medications by herself. No problems with speech. Patient is on aricept 10mg daily with no problems. Patient followed with Neuropsychology who states her memory problems are mulitfactorial from dementia and anxiety/depression. Patient does not drive. Patient continues to need assistance with finances. Otherwise no numbness, no weakness, no visual deficits, no ambulatory dysfunction. Patient has had trouble driving in [place].    03/07/2024: Patient is a 78 year old woman presenting for followup. Patient previously discussed about remeron and about adjusting the medication as patient has difficulty with sleep. Patient placed on aricept and added namenda 5mg BID for memory. Aspirin held  "previously due to petechial hemorrhages. Namenda subjectively worsened memory. Patient's memory was worsening until patient stopped taking the namenda more than a month. Patient became better in preparing for herself for today's visit. Patient still forgetful but no longer confused. Patient doing with ADLs. Patient continues to not drive and does not do her own finances. Patient has been continued on donepezil 10mg daily. No new vision problems, no new weakness, or gait dysfunction.\"    MRI brain NeuroQuant wo contrast completed on 06/11/2024. 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 Offered multiple modalities of supportive care for Alzheimer's. Patient currently on Aricept 10mg daily. I talked that we can increase aricept to 20mg daily but would need to talk about risk factors including cardiac and GI side effects. After weighing decision based on above, daughter in law want to hold off. Also recommended lifestyle modifications such as balanced diet, regular exercise, doing mentally stimulating activities, and socializations. Daughter in law agreeable. Also recommended cognitive therapy which can be done to help with the mind. Daughter in law agreeable. Also talked about future care of patient and need of assistance in the future. Daughter in law at this time currently managing patient's care well. Patient appreciative of call and had no further questions.\"    Patient was accompanied to today's outpatient skilled Speech Therapy evaluation by her son (Waldo) and daughter-in-law (Diana). Patient's primary/preferred language is German. Patient expressed that she did not wish to use  services during today's evaluation. " Patient's caregivers aided in translation between clinician and patient on this date of service. Patient was not a valid historian; caregivers assisted with report. Caregivers are uncertain of the actual onset of patient's cognitive deterioration, but it occurred when she was residing with her daughter in [place] and continues today. Patient moved to [place] with her son and daughter-in-law in August 2022. Per caregivers and review of record, patient is experiencing difficulty with memory (STM and LTM), attention, word finding, being easily distracted, losing train of thought when communicating. Patient with reported difficulty remembering appointments unless reminded, taking medications (daughter-in-law fills patient's plastic pill box), leaving stove or faucet on, and conversations that she has already had. It was reported that patient is very repetitive. Finances are taken care of by family. Patient is no longer driving. Patient helps at home with cleaning, yardwork, dishes, and folding laundry. Patient attends [employer] and interacts with her family. It was reported that she spends time alone in her room ([facility] [place]). Daughter-in-law assists with some ADLs. Adjustments with medications are currently being managed by her physicians.      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 BCAT is sensitive to the full spectrum of  "cognitive functioning (normal, MCI, mild dementia, moderate-severe dementia) and can predict basic and instrumental activities of daily living (ADL, IADL).     During today’s administration of the test, patient achieved a total score of 23/50 points.     Using the BCAT Crosswalk Table as a reference, patient’s score falls within the range and may be suggestive of \"Moderate to Severe Dementia\". The Crosswalk Table suggests the following:    Cognitive Stage: Normal (BCAT Range: 46-50 // MMSE: 28-30 // GDS: 1-2)  Cognitive & Functional Issues: No functional deficit; independent living; may be subjective memory complaints but little to no objective evidence.    Cognitive Stage: Mild Cognitive Impairment (BCAT Range: 34-46 // MMSE: 24-27 // GDS: 3)  Cognitive & Functional Issues: Generally functionally normal, but early specific functional declines (IADL); subjective and objective memory deficits. Individuals at lower end of range more likely to have more significant cognitive deficits. Lower scores more suggestive of residential support needs. At the bottom range of MCI, consider medication management and consider support around community reintegration.    Cognitive Stage: Mild Dementia (BCAT Range: 26-34 // MMSE: 19-23 // GDS: 4)  Cognitive & Functional Issues: IADL deficits; typically requires residential support services; clear objective evidence of memory and other cognitive declines. Medication management and community reintegration support indicated for many people in this range.    Cognitive Stage: Moderate to Severe Dementia (BCAT Range: 0-25 // MMSE: 0-18 // GDS: 5-7)  Cognitive & Functional Issues: Moderate (upper end of range) - Pervasive functional deficits (IADLs), but ADLs generally intact; marked deficits in memory and executive functions; behavioral and psychological symptoms are common; requires significant residential support. // Severe (lower end of range) - Needs assistance in ADLs/IADLs; pervasive " cognitive deficits; requires complex care.      Treatment:  N/A      Visit Tracking:  POC   Expires Auth Expiration Date ST Visit Limit   09/26/2024 PENDING BOMN          Visit/Unit Tracking:  Auth Status Date 06/26/24   PENDING Used 1    Remaining 1

## 2024-06-26 ENCOUNTER — EVALUATION (OUTPATIENT)
Dept: SPEECH THERAPY | Facility: CLINIC | Age: 79
End: 2024-06-26
Payer: COMMERCIAL

## 2024-06-26 DIAGNOSIS — R48.8 OTHER SYMBOLIC DYSFUNCTIONS: Primary | ICD-10-CM

## 2024-06-26 DIAGNOSIS — G30.0 MILD EARLY ONSET ALZHEIMER'S DEMENTIA WITHOUT BEHAVIORAL DISTURBANCE, PSYCHOTIC DISTURBANCE, MOOD DISTURBANCE, OR ANXIETY (HCC): ICD-10-CM

## 2024-06-26 DIAGNOSIS — F02.A0 MILD EARLY ONSET ALZHEIMER'S DEMENTIA WITHOUT BEHAVIORAL DISTURBANCE, PSYCHOTIC DISTURBANCE, MOOD DISTURBANCE, OR ANXIETY (HCC): ICD-10-CM

## 2024-06-26 PROCEDURE — 92523 SPEECH SOUND LANG COMPREHEN: CPT | Performed by: SPEECH-LANGUAGE PATHOLOGIST

## 2024-06-27 ENCOUNTER — OFFICE VISIT (OUTPATIENT)
Dept: INTERNAL MEDICINE CLINIC | Facility: CLINIC | Age: 79
End: 2024-06-27
Payer: COMMERCIAL

## 2024-06-27 VITALS
HEIGHT: 59 IN | BODY MASS INDEX: 25 KG/M2 | HEART RATE: 58 BPM | TEMPERATURE: 97.4 F | WEIGHT: 124 LBS | OXYGEN SATURATION: 99 % | SYSTOLIC BLOOD PRESSURE: 148 MMHG | DIASTOLIC BLOOD PRESSURE: 86 MMHG

## 2024-06-27 DIAGNOSIS — G30.0 MILD EARLY ONSET ALZHEIMER'S DEMENTIA WITHOUT BEHAVIORAL DISTURBANCE, PSYCHOTIC DISTURBANCE, MOOD DISTURBANCE, OR ANXIETY (HCC): Primary | ICD-10-CM

## 2024-06-27 DIAGNOSIS — E78.2 MIXED HYPERLIPIDEMIA: ICD-10-CM

## 2024-06-27 DIAGNOSIS — I10 BENIGN HYPERTENSION: ICD-10-CM

## 2024-06-27 DIAGNOSIS — F02.A0 MILD EARLY ONSET ALZHEIMER'S DEMENTIA WITHOUT BEHAVIORAL DISTURBANCE, PSYCHOTIC DISTURBANCE, MOOD DISTURBANCE, OR ANXIETY (HCC): Primary | ICD-10-CM

## 2024-06-27 DIAGNOSIS — F34.1 DYSTHYMIC DISORDER: ICD-10-CM

## 2024-06-27 DIAGNOSIS — E03.9 ACQUIRED HYPOTHYROIDISM: ICD-10-CM

## 2024-06-27 PROCEDURE — G2211 COMPLEX E/M VISIT ADD ON: HCPCS | Performed by: INTERNAL MEDICINE

## 2024-06-27 PROCEDURE — 99214 OFFICE O/P EST MOD 30 MIN: CPT | Performed by: INTERNAL MEDICINE

## 2024-06-27 NOTE — PROGRESS NOTES
Assessment/Plan:             1. 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, psychotic disturbance, mood disturbance, or anxiety (HCC)  Comments:  Continue same med  2. Acquired hypothyroidism  Comments:  Continue same med  3. Benign hypertension  Comments:  Continue same med  4. Dysthymic disorder  Comments:  Continue same med  5. Mixed hyperlipidemia         Subjective:      Patient ID: Jacklyn Angeles is a 79 y.o. female.    Follow-up on multiple medical problems to ensure they are stable on current medications        The following portions of the patient's history were reviewed and updated as appropriate: She  has a past medical history of Anxiety, Arthritis, Breast cancer (HCC), Dementia (HCC), Depression, Disease of thyroid gland, Diverticulitis of colon, History of chemotherapy, Hypertension, and Urinary incontinence.  She   Patient Active Problem List    Diagnosis Date Noted    Dermatitis 02/19/2024    Paronychia of toe of right foot due to ingrown toenail 02/19/2024    Stroke ([facility]) 09/14/2023    Depression, recurrent ([facility]) 07/17/2023    Dysthymic disorder 01/16/2023    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, psychotic disturbance, mood disturbance, or anxiety (HCC) 01/16/2023    Moderate dementia ([facility]) 01/10/2023    Rotator cuff arthropathy of both shoulders 11/10/2022    Benign hypertension 11/10/2022    Mixed hyperlipidemia 11/10/2022    Acquired hypothyroidism 11/10/2022    Memory loss 11/10/2022     She  has a past surgical history that includes Breast surgery; Colon surgery; Knee surgery; and Breast lumpectomy (Left).  Her family history includes Dementia in her mother.  She  reports that she has never smoked. She has never been exposed to tobacco smoke. She has never used smokeless tobacco. She reports that she does not drink alcohol and does not use drugs.  Current Outpatient Medications   Medication Sig Dispense Refill    atorvastatin (LIPITOR) 20  mg tablet Take 1 tablet (20 mg total) by mouth daily 180 tablet 0    dicyclomine (BENTYL) 20 mg tablet Take 1 tablet (20 mg total) by mouth 2 (two) times a day as needed (abd pain) 20 tablet 0    donepezil (ARICEPT) 10 mg tablet Take 1 tablet (10 mg total) by mouth daily at bedtime 180 tablet 0    irbesartan (AVAPRO) 150 mg tablet Take 1 tablet (150 mg total) by mouth daily at bedtime 90 tablet 0    levothyroxine (Euthyrox) 112 mcg tablet Take 1 tablet (112 mcg total) by mouth daily in the early morning 90 tablet 3    meloxicam (Mobic) 7.5 mg tablet Take 1 tablet (7.5 mg total) by mouth daily 180 tablet 0    mirtazapine (REMERON) 15 mg tablet Take 1 tablet (15 mg total) by mouth daily at bedtime 90 tablet 1    mupirocin (BACTROBAN) 2 % ointment Apply topically 3 (three) times a day 30 g 0    triamcinolone (KENALOG) 0.1 % cream Apply topically 2 (two) times a day 80 g 0    benzonatate (TESSALON PERLES) 100 mg capsule Take 1 capsule (100 mg total) by mouth 3 (three) times a day as needed for cough (Patient not taking: Reported on 2/19/2024) 20 capsule 0     No current facility-administered medications for this visit.     Current Outpatient Medications on File Prior to Visit   Medication Sig    atorvastatin (LIPITOR) 20 mg tablet Take 1 tablet (20 mg total) by mouth daily    dicyclomine (BENTYL) 20 mg tablet Take 1 tablet (20 mg total) by mouth 2 (two) times a day as needed (abd pain)    donepezil (ARICEPT) 10 mg tablet Take 1 tablet (10 mg total) by mouth daily at bedtime    irbesartan (AVAPRO) 150 mg tablet Take 1 tablet (150 mg total) by mouth daily at bedtime    levothyroxine (Euthyrox) 112 mcg tablet Take 1 tablet (112 mcg total) by mouth daily in the early morning    meloxicam (Mobic) 7.5 mg tablet Take 1 tablet (7.5 mg total) by mouth daily    mirtazapine (REMERON) 15 mg tablet Take 1 tablet (15 mg total) by mouth daily at bedtime    mupirocin (BACTROBAN) 2 % ointment Apply topically 3 (three) times a day     "triamcinolone (KENALOG) 0.1 % cream Apply topically 2 (two) times a day    benzonatate (TESSALON PERLES) 100 mg capsule Take 1 capsule (100 mg total) by mouth 3 (three) times a day as needed for cough (Patient not taking: Reported on 2/19/2024)     No current facility-administered medications on file prior to visit.     She has No Known Allergies..    Review of Systems   Constitutional:  Negative for chills and fever.   HENT:  Negative for congestion, ear pain and sore throat.    Eyes:  Negative for pain.   Respiratory:  Negative for cough and shortness of breath.    Cardiovascular:  Negative for chest pain and leg swelling.   Gastrointestinal:  Negative for abdominal pain, nausea and vomiting.   Endocrine: Negative for polyuria.   Genitourinary:  Negative for difficulty urinating, frequency and urgency.   Musculoskeletal:  Positive for arthralgias and back pain.   Skin:  Positive for rash.   Neurological:  Negative for weakness and headaches.   Psychiatric/Behavioral:  Negative for sleep disturbance. The patient is not nervous/anxious.          Objective:      /86 (BP Location: Left arm, Patient Position: Sitting, Cuff Size: Standard)   Pulse 58   Temp (!) 97.4 °F (36.3 °C) (Temporal)   Ht 4' 11\" (1.499 m)   Wt 56.2 kg (124 lb)   SpO2 99%   BMI 25.04 kg/m²     No results found for this or any previous visit (from the past 1344 hour(s)).     Physical Exam  Constitutional:       Appearance: Normal appearance.   HENT:      Head: Normocephalic.      Right Ear: External ear normal.      Left Ear: External ear normal.      Nose: Nose normal. No congestion.      Mouth/Throat:      Mouth: Mucous membranes are moist.      Pharynx: Oropharynx is clear. No oropharyngeal exudate or posterior oropharyngeal erythema.   Eyes:      Extraocular Movements: Extraocular movements intact.      Conjunctiva/sclera: Conjunctivae normal.   Cardiovascular:      Rate and Rhythm: Normal rate and regular rhythm.      Heart sounds: " Normal heart sounds. No murmur heard.  Pulmonary:      Effort: Pulmonary effort is normal.      Breath sounds: Normal breath sounds. No wheezing or rales.   Abdominal:      General: Abdomen is flat. There is no distension.      Palpations: Abdomen is soft.      Tenderness: There is no abdominal tenderness.   Musculoskeletal:         General: Normal range of motion.      Cervical back: Normal range of motion and neck supple.      Right lower leg: No edema.      Left lower leg: No edema.   Lymphadenopathy:      Cervical: No cervical adenopathy.   Skin:     General: Skin is warm.   Neurological:      General: No focal deficit present.      Mental Status: She is alert and oriented to person, place, and time.

## 2024-07-03 DIAGNOSIS — L30.9 DERMATITIS: ICD-10-CM

## 2024-07-03 RX ORDER — TRIAMCINOLONE ACETONIDE 1 MG/G
CREAM TOPICAL
Qty: 80 G | Refills: 5 | Status: SHIPPED | OUTPATIENT
Start: 2024-07-03

## 2024-07-08 DIAGNOSIS — I10 BENIGN HYPERTENSION: ICD-10-CM

## 2024-07-08 RX ORDER — IRBESARTAN 150 MG/1
150 TABLET ORAL
Qty: 100 TABLET | Refills: 1 | Status: SHIPPED | OUTPATIENT
Start: 2024-07-08

## 2024-08-05 ENCOUNTER — TELEPHONE (OUTPATIENT)
Dept: SPEECH THERAPY | Facility: CLINIC | Age: 79
End: 2024-08-05

## 2024-08-11 ENCOUNTER — HOSPITAL ENCOUNTER (EMERGENCY)
Facility: HOSPITAL | Age: 79
Discharge: HOME | End: 2024-08-11
Attending: EMERGENCY MEDICINE
Payer: COMMERCIAL

## 2024-08-11 VITALS
TEMPERATURE: 98 F | OXYGEN SATURATION: 100 % | SYSTOLIC BLOOD PRESSURE: 146 MMHG | DIASTOLIC BLOOD PRESSURE: 62 MMHG | HEART RATE: 61 BPM | RESPIRATION RATE: 16 BRPM

## 2024-08-11 DIAGNOSIS — N39.0 UTI (URINARY TRACT INFECTION): Primary | ICD-10-CM

## 2024-08-11 LAB
2HR DELTA HS TROPONIN: 0 NG/L
ALBUMIN SERPL BCG-MCNC: 4.4 G/DL (ref 3.5–5)
ALP SERPL-CCNC: 84 U/L (ref 34–104)
ALT SERPL W P-5'-P-CCNC: 14 U/L (ref 7–52)
ANION GAP SERPL CALCULATED.3IONS-SCNC: 6 MMOL/L (ref 4–13)
AST SERPL W P-5'-P-CCNC: 21 U/L (ref 13–39)
ATRIAL RATE: 61 BPM
BACTERIA UR QL AUTO: ABNORMAL /HPF
BASOPHILS # BLD AUTO: 0.05 THOUSANDS/ÂΜL (ref 0–0.1)
BASOPHILS NFR BLD AUTO: 1 % (ref 0–1)
BILIRUB SERPL-MCNC: 0.55 MG/DL (ref 0.2–1)
BILIRUB UR QL STRIP: NEGATIVE
BUN SERPL-MCNC: 26 MG/DL (ref 5–25)
CALCIUM SERPL-MCNC: 8.8 MG/DL (ref 8.4–10.2)
CARDIAC TROPONIN I PNL SERPL HS: 5 NG/L
CARDIAC TROPONIN I PNL SERPL HS: 5 NG/L
CHLORIDE SERPL-SCNC: 107 MMOL/L (ref 96–108)
CLARITY UR: CLEAR
CO2 SERPL-SCNC: 29 MMOL/L (ref 21–32)
COLOR UR: ABNORMAL
CREAT SERPL-MCNC: 1.32 MG/DL (ref 0.6–1.3)
EOSINOPHIL # BLD AUTO: 0.21 THOUSAND/ÂΜL (ref 0–0.61)
EOSINOPHIL NFR BLD AUTO: 3 % (ref 0–6)
ERYTHROCYTE [DISTWIDTH] IN BLOOD BY AUTOMATED COUNT: 14.1 % (ref 11.6–15.1)
GFR SERPL CREATININE-BSD FRML MDRD: 38 ML/MIN/1.73SQ M
GLUCOSE SERPL-MCNC: 67 MG/DL (ref 65–140)
GLUCOSE SERPL-MCNC: 91 MG/DL (ref 65–140)
GLUCOSE UR STRIP-MCNC: NEGATIVE MG/DL
HCT VFR BLD AUTO: 44 % (ref 34.8–46.1)
HGB BLD-MCNC: 13.8 G/DL (ref 11.5–15.4)
HGB UR QL STRIP.AUTO: NEGATIVE
IMM GRANULOCYTES # BLD AUTO: 0.04 THOUSAND/UL (ref 0–0.2)
IMM GRANULOCYTES NFR BLD AUTO: 1 % (ref 0–2)
KETONES UR STRIP-MCNC: NEGATIVE MG/DL
LEUKOCYTE ESTERASE UR QL STRIP: ABNORMAL
LYMPHOCYTES # BLD AUTO: 2.47 THOUSANDS/ÂΜL (ref 0.6–4.47)
LYMPHOCYTES NFR BLD AUTO: 31 % (ref 14–44)
MCH RBC QN AUTO: 28.6 PG (ref 26.8–34.3)
MCHC RBC AUTO-ENTMCNC: 31.4 G/DL (ref 31.4–37.4)
MCV RBC AUTO: 91 FL (ref 82–98)
MONOCYTES # BLD AUTO: 0.56 THOUSAND/ÂΜL (ref 0.17–1.22)
MONOCYTES NFR BLD AUTO: 7 % (ref 4–12)
NEUTROPHILS # BLD AUTO: 4.59 THOUSANDS/ÂΜL (ref 1.85–7.62)
NEUTS SEG NFR BLD AUTO: 57 % (ref 43–75)
NITRITE UR QL STRIP: NEGATIVE
NON-SQ EPI CELLS URNS QL MICRO: ABNORMAL /HPF
NRBC BLD AUTO-RTO: 0 /100 WBCS
P AXIS: 30 DEGREES
PH UR STRIP.AUTO: 5.5 [PH]
PLATELET # BLD AUTO: 215 THOUSANDS/UL (ref 149–390)
PMV BLD AUTO: 12.8 FL (ref 8.9–12.7)
POTASSIUM SERPL-SCNC: 3.8 MMOL/L (ref 3.5–5.3)
PR INTERVAL: 128 MS
PROT SERPL-MCNC: 7.2 G/DL (ref 6.4–8.4)
PROT UR STRIP-MCNC: ABNORMAL MG/DL
QRS AXIS: -1 DEGREES
QRSD INTERVAL: 78 MS
QT INTERVAL: 442 MS
QTC INTERVAL: 444 MS
RBC # BLD AUTO: 4.82 MILLION/UL (ref 3.81–5.12)
RBC #/AREA URNS AUTO: ABNORMAL /HPF
SODIUM SERPL-SCNC: 142 MMOL/L (ref 135–147)
SP GR UR STRIP.AUTO: 1.01 (ref 1–1.03)
T WAVE AXIS: 115 DEGREES
UROBILINOGEN UR STRIP-ACNC: <2 MG/DL
VENTRICULAR RATE: 61 BPM
WBC # BLD AUTO: 7.92 THOUSAND/UL (ref 4.31–10.16)
WBC #/AREA URNS AUTO: ABNORMAL /HPF

## 2024-08-11 PROCEDURE — 93010 ELECTROCARDIOGRAM REPORT: CPT | Performed by: INTERNAL MEDICINE

## 2024-08-11 PROCEDURE — 99284 EMERGENCY DEPT VISIT MOD MDM: CPT

## 2024-08-11 PROCEDURE — 81001 URINALYSIS AUTO W/SCOPE: CPT

## 2024-08-11 PROCEDURE — 85025 COMPLETE CBC W/AUTO DIFF WBC: CPT

## 2024-08-11 PROCEDURE — 99285 EMERGENCY DEPT VISIT HI MDM: CPT | Performed by: EMERGENCY MEDICINE

## 2024-08-11 PROCEDURE — 80053 COMPREHEN METABOLIC PANEL: CPT

## 2024-08-11 PROCEDURE — 93005 ELECTROCARDIOGRAM TRACING: CPT

## 2024-08-11 PROCEDURE — 36415 COLL VENOUS BLD VENIPUNCTURE: CPT

## 2024-08-11 PROCEDURE — 82948 REAGENT STRIP/BLOOD GLUCOSE: CPT

## 2024-08-11 PROCEDURE — 84484 ASSAY OF TROPONIN QUANT: CPT

## 2024-08-11 RX ORDER — CEPHALEXIN 500 MG/1
500 CAPSULE ORAL EVERY 6 HOURS SCHEDULED
Qty: 28 CAPSULE | Refills: 0 | Status: SHIPPED | OUTPATIENT
Start: 2024-08-11 | End: 2024-08-11 | Stop reason: CLARIF

## 2024-08-11 RX ORDER — CEPHALEXIN 500 MG/1
500 CAPSULE ORAL EVERY 8 HOURS SCHEDULED
Qty: 21 CAPSULE | Refills: 0 | Status: SHIPPED | OUTPATIENT
Start: 2024-08-11 | End: 2024-08-18

## 2024-08-11 NOTE — ED PROVIDER NOTES
Chief Complaint   Patient presents with    Dizziness     Presents with dizziness that started today while at [employer], feeling like the room is spinning. History of TIA's denies HA and nausea     Eye Problem     Sclera of R eye red that patient and family noticed today      History of Present Illness and Review of Systems   This is a 79 y.o. female with past medical history for hypertension anxiety arthritis who comes in with her daughter after she became dizzy during [employer] today.  The patient states that the dizziness was room spinning in nature and resolved prior to arrival.  The patient also has a new subconjuctival hemorrhage. She states that she has had periods of constipation but denies any recent constipation that she was bearing down . She also denies any recent head strike/ falls.   - No language barrier.     No other complaints for this encounter.    Remainder of ROS Reviewed and Non-Pertinent    Past Medical, Past Surgical History:    has a past medical history of Anxiety, Arthritis, Breast cancer (HCC), Dementia (HCC), Depression, Disease of thyroid gland, Diverticulitis of colon, History of chemotherapy, Hypertension, and Urinary incontinence.   has a past surgical history that includes Breast surgery; Colon surgery; Knee surgery; and Breast lumpectomy (Left).     Allergies:   No Known Allergies    Social and Family History:     Social History     Substance and Sexual Activity   Alcohol Use Never     Social History     Tobacco Use   Smoking Status Never    Passive exposure: Never   Smokeless Tobacco Never     Social History     Substance and Sexual Activity   Drug Use Never       Physical Examination     Vitals:    08/11/24 1232 08/11/24 1233 08/11/24 1234 08/11/24 1321   BP:  (!) 207/97  146/62   BP Location:  Left arm     Pulse: 61      Resp: 16      Temp: 98 °F (36.7 °C)      TempSrc: Temporal      SpO2:   100%        Physical Exam  Vitals and nursing note reviewed.   Constitutional:       General:  She is not in acute distress.     Appearance: She is well-developed.   HENT:      Head: Normocephalic and atraumatic.   Eyes:      General: Vision grossly intact.      Extraocular Movements:      Right eye: Normal extraocular motion.      Left eye: Normal extraocular motion.      Conjunctiva/sclera:      Right eye: Hemorrhage present.      Left eye: No hemorrhage.    Cardiovascular:      Rate and Rhythm: Normal rate and regular rhythm.      Heart sounds: No murmur heard.  Pulmonary:      Effort: Pulmonary effort is normal. No respiratory distress.      Breath sounds: Normal breath sounds. No stridor. No wheezing, rhonchi or rales.   Chest:      Chest wall: No tenderness.   Abdominal:      Palpations: Abdomen is soft.      Tenderness: There is no abdominal tenderness.   Musculoskeletal:         General: No swelling.      Cervical back: Neck supple.   Skin:     General: Skin is warm and dry.      Capillary Refill: Capillary refill takes less than 2 seconds.   Neurological:      Mental Status: She is alert and oriented to person, place, and time. Mental status is at baseline.      Cranial Nerves: No cranial nerve deficit.      Sensory: No sensory deficit.      Motor: No weakness.      Coordination: Coordination normal.      Gait: Gait normal.   Psychiatric:         Mood and Affect: Mood normal.           Procedures   Procedures      MDM:   Medical Decision Making  Amount and/or Complexity of Data Reviewed  Labs: ordered. Decision-making details documented in ED Course.    Risk  Prescription drug management.    79-year-old woman presents with right eye conjunctival hemorrhage in the setting of cough.  She states that she was at [employer] earlier today and had a room spinning sensation.  Patient also has had increased confusion over the past week.  The patient's vitals are stable and she is in no acute distress.  She does state that she is having some urinary discomfort.    Exam above    Differential diagnoses include  vertigo that is peripheral in etiology and resolved at this time.  Patient EKG shows some T wave inversions in the lateral leads.  Patient's troponins were initially 5 with a delta of 0 making ACS unlikely.  Stroke also unlikely at this time given patient's exam.  Patient urinalysis was significant for occasional bacteria with leukocytes.    Patient at this time medically cleared for discharged with antibiotics to clear her UTI and recommendation to follow-up with her outpatient PCP      ED Course as of 08/16/24 1028   Sun Aug 11, 2024   1345 EKG: no prior. T wave inversions in lateral leads. Will get troponins   1345 POC Glucose: 91   1408 Hemoglobin: 13.8   1547 Delta 2hr hsTnI: 0   1550 Bacteria, UA: Occasional   1550 Leukocytes, UA(!): Moderate      Final Dispo   Final Diagnosis:  1. UTI (urinary tract infection)      Time reflects when diagnosis was documented in both MDM as applicable and the Disposition within this note       Time User Action Codes Description Comment    8/11/2024  4:16 PM Jose R Altman Add [N39.0] UTI (urinary tract infection)           ED Disposition       ED Disposition   Discharge    Condition   Stable    Date/Time   Sun Aug 11, 2024  4:17 PM    Comment   Jacklyn Angeles discharge to home/self care.                   Follow-up Information       Follow up With Specialties Details Why Contact Info    Ez Ruiz MD Internal Medicine   [address] [place] [address]  [phone]            Medications - No data to display    Risk Stratification Tools                Orders Placed This Encounter   Procedures    ED ECG Documentation Only    CBC and differential    Comprehensive metabolic panel    UA w Reflex to Microscopic w Reflex to Culture    HS Troponin 0hr (reflex protocol)    HS Troponin I 2hr    Urine Microscopic    Fingerstick Glucose (POCT)    ECG 12 lead       Labs:     Labs Reviewed   CBC AND DIFFERENTIAL - Abnormal       Result Value Ref Range Status    WBC 7.92  4.31 -  10.16 Thousand/uL Final    RBC 4.82  3.81 - 5.12 Million/uL Final    Hemoglobin 13.8  11.5 - 15.4 g/dL Final    Hematocrit 44.0  34.8 - 46.1 % Final    MCV 91  82 - 98 fL Final    MCH 28.6  26.8 - 34.3 pg Final    MCHC 31.4  31.4 - 37.4 g/dL Final    RDW 14.1  11.6 - 15.1 % Final    MPV 12.8 (*) 8.9 - 12.7 fL Final    Platelets 215  149 - 390 Thousands/uL Final    nRBC 0  /100 WBCs Final    Segmented % 57  43 - 75 % Final    Immature Grans % 1  0 - 2 % Final    Lymphocytes % 31  14 - 44 % Final    Monocytes % 7  4 - 12 % Final    Eosinophils Relative 3  0 - 6 % Final    Basophils Relative 1  0 - 1 % Final    Absolute Neutrophils 4.59  1.85 - 7.62 Thousands/µL Final    Absolute Immature Grans 0.04  0.00 - 0.20 Thousand/uL Final    Absolute Lymphocytes 2.47  0.60 - 4.47 Thousands/µL Final    Absolute Monocytes 0.56  0.17 - 1.22 Thousand/µL Final    Eosinophils Absolute 0.21  0.00 - 0.61 Thousand/µL Final    Basophils Absolute 0.05  0.00 - 0.10 Thousands/µL Final   COMPREHENSIVE METABOLIC PANEL - Abnormal    Sodium 142  135 - 147 mmol/L Final    Potassium 3.8  3.5 - 5.3 mmol/L Final    Chloride 107  96 - 108 mmol/L Final    CO2 29  21 - 32 mmol/L Final    ANION GAP 6  4 - 13 mmol/L Final    BUN 26 (*) 5 - 25 mg/dL Final    Creatinine 1.32 (*) 0.60 - 1.30 mg/dL Final    Comment: Standardized to IDMS reference method    Glucose 67  65 - 140 mg/dL Final    Comment: If the patient is fasting, the ADA then defines impaired fasting glucose as > 100 mg/dL and diabetes as > or equal to 123 mg/dL.    Calcium 8.8  8.4 - 10.2 mg/dL Final    AST 21  13 - 39 U/L Final    ALT 14  7 - 52 U/L Final    Comment: Specimen collection should occur prior to Sulfasalazine administration due to the potential for falsely depressed results.     Alkaline Phosphatase 84  34 - 104 U/L Final    Total Protein 7.2  6.4 - 8.4 g/dL Final    Albumin 4.4  3.5 - 5.0 g/dL Final    Total Bilirubin 0.55  0.20 - 1.00 mg/dL Final    Comment: Use of this assay  "is not recommended for patients undergoing treatment with eltrombopag due to the potential for falsely elevated results.  N-acetyl-p-benzoquinone imine (metabolite of Acetaminophen) will generate erroneously low results in samples for patients that have taken an overdose of Acetaminophen.    eGFR 38  ml/min/1.73sq m Final    Narrative:     [employer] guidelines for Chronic Kidney Disease (CKD):     Stage 1 with normal or high GFR (GFR > 90 mL/min/1.73 square meters)    Stage 2 Mild CKD (GFR = 60-89 mL/min/1.73 square meters)    Stage 3A Moderate CKD (GFR = 45-59 mL/min/1.73 square meters)    Stage 3B Moderate CKD (GFR = 30-44 mL/min/1.73 square meters)    Stage 4 Severe CKD (GFR = 15-29 mL/min/1.73 square meters)    Stage 5 End Stage CKD (GFR <15 mL/min/1.73 square meters)  Note: GFR calculation is accurate only with a steady state creatinine   UA W REFLEX TO MICROSCOPIC WITH REFLEX TO CULTURE - Abnormal    Color, UA Light Yellow   Final    Clarity, UA Clear   Final    Specific Gravity, UA 1.009  1.003 - 1.030 Final    pH, UA 5.5  4.5, 5.0, 5.5, 6.0, 6.5, 7.0, 7.5, 8.0 Final    Leukocytes, UA Moderate (*) Negative Final    Nitrite, UA Negative  Negative Final    Protein, UA 30 (1+) (*) Negative mg/dl Final    Glucose, UA Negative  Negative mg/dl Final    Ketones, UA Negative  Negative mg/dl Final    Urobilinogen, UA <2.0  <2.0 mg/dl mg/dl Final    Bilirubin, UA Negative  Negative Final    Occult Blood, UA Negative  Negative Final   URINE MICROSCOPIC - Abnormal    RBC, UA None Seen  None Seen, 1-2 /hpf Final    WBC, UA 2-4 (*) None Seen, 1-2 /hpf Final    Epithelial Cells Occasional  None Seen, Occasional /hpf Final    Bacteria, UA Occasional  None Seen, Occasional /hpf Final   HS TROPONIN I 0HR - Normal    hs TnI 0hr 5  \"Refer to ACS Flowchart\"- see link ng/L Final    Comment:                                              Initial (time 0) result  If >=50 ng/L, Myocardial injury suggested ;  " "Type of myocardial injury and treatment strategy  to be determined.  If 5-49 ng/L, a delta result at 2 hours and or 4 hours will be needed to further evaluate.  If <4 ng/L, and chest pain has been >3 hours since onset, patient may qualify for discharge based on the HEART score in the ED.  If <5 ng/L and <3hours since onset of chest pain, a delta result at 2 hours will be needed to further evaluate.    HS Troponin 99th Percentile URL of a Health Population=12 ng/L with a 95% Confidence Interval of 8-18 ng/L.    Second Troponin (time 2 hours)  If calculated delta >= 20 ng/L,  Myocardial injury suggested ; Type of myocardial injury and treatment strategy to be determined.  If 5-49 ng/L and the calculated delta is 5-19 ng/L, consult medical service for evaluation.  Continue evaluation for ischemia on ecg and other possible etiology and repeat hs troponin at 4 hours.  If delta is <5 ng/L at 2 hours, consider discharge based on risk stratification via the HEART score (if in ED), or CHRISTY risk score in IP/Observation.    HS Troponin 99th Percentile URL of a Health Population=12 ng/L with a 95% Confidence Interval of 8-18 ng/L.   HS TROPONIN I 2HR - Normal    hs TnI 2hr 5  \"Refer to ACS Flowchart\"- see link ng/L Final    Comment:                                              Initial (time 0) result  If >=50 ng/L, Myocardial injury suggested ;  Type of myocardial injury and treatment strategy  to be determined.  If 5-49 ng/L, a delta result at 2 hours and or 4 hours will be needed to further evaluate.  If <4 ng/L, and chest pain has been >3 hours since onset, patient may qualify for discharge based on the HEART score in the ED.  If <5 ng/L and <3hours since onset of chest pain, a delta result at 2 hours will be needed to further evaluate.    HS Troponin 99th Percentile URL of a Health Population=12 ng/L with a 95% Confidence Interval of 8-18 ng/L.    Second Troponin (time 2 hours)  If calculated delta >= 20 ng/L,  Myocardial " "injury suggested ; Type of myocardial injury and treatment strategy to be determined.  If 5-49 ng/L and the calculated delta is 5-19 ng/L, consult medical service for evaluation.  Continue evaluation for ischemia on ecg and other possible etiology and repeat hs troponin at 4 hours.  If delta is <5 ng/L at 2 hours, consider discharge based on risk stratification via the HEART score (if in ED), or CHRISTY risk score in IP/Observation.    HS Troponin 99th Percentile URL of a Health Population=12 ng/L with a 95% Confidence Interval of 8-18 ng/L.    Delta 2hr hsTnI 0  <20 ng/L Final   POCT GLUCOSE - Normal    POC Glucose 91  65 - 140 mg/dl Final       Imaging:     No orders to display      All details of the evaluation and treatment plan were made clear and additionally all questions and concerns were addressed while under my care.    Portions of the record may have been created with voice recognition software. Occasional wrong word or \"sound a like\" substitutions may have occurred due to the inherent limitations of voice recognition software. Read the chart carefully and recognize, using context, where substitutions have occurred.    The attending physician physically available and evaluated the above patient alongside myself.      Jose R Altman MD  08/16/24 1028    "

## 2024-08-11 NOTE — ED ATTENDING ATTESTATION
8/11/2024   IErin MD, saw and evaluated the patient. I have discussed the patient with the resident/non-physician practitioner and agree with the resident's/non-physician practitioner's findings, Plan of Care, and MDM as documented in the resident's/non-physician practitioner's note, except where noted. All available labs and Radiology studies were reviewed.  I was present for key portions of any procedure(s) performed by the resident/non-physician practitioner and I was immediately available to provide assistance.       At this point I agree with the current assessment done in the Emergency Department.  I have conducted an independent evaluation of this patient a history and physical is as follows:    Unit/Bed#: QCB Encounter: [id number]    Chief Complaint   Patient presents with    Dizziness     Presents with dizziness that started today while at [employer], feeling like the room is spinning. History of TIA's denies HA and nausea     Eye Problem     Sclera of R eye red that patient and family noticed today      79-year-old female with past medical history of memory issues, hypertension, breast cancer, presenting with transient dizziness.  Patient was with her daughter-in-law at the [employer] today.  She developed room spinning sensation that lasted for seconds and resolved.  Patient's daughter-in-law also noticed that patient has redness of right eye.  Between vertigo and the eye redness, daughter-in-law was concerned and is presenting with the patient for further evaluation.  Patient feels back to baseline at present.  She does not have any headache.  She reports baseline dry cough which is not new.  No chest pain.  No palpitations.  No nausea or vomiting.  No abdominal pain.  No burning with urination.  No focal weakness or numbness.  Again, patient feels completely back to baseline at this time.  She has no changes in vision.  She has no eye trauma.    Patient speaks English and Slovenian, history obtained  in both languages as well as with assistance of patient's daughter-in-law.    Physical Exam  ED Triage Vitals   Temperature Pulse Respirations Blood Pressure SpO2   08/11/24 1232 08/11/24 1232 08/11/24 1232 08/11/24 1233 08/11/24 1234   98 °F (36.7 °C) 61 16 (!) 207/97 100 %      Temp Source Heart Rate Source Patient Position - Orthostatic VS BP Location FiO2 (%)   08/11/24 1232 08/11/24 1232 08/11/24 1233 08/11/24 1233 --   Temporal Monitor Sitting Left arm       Pain Score       --                  Vital signs and nursing notes reviewed    CONSTITUTIONAL: female appearing stated age resting in bed, in no acute distress  HEENT: atraumatic, normocephalic. Sclera anicteric, right eye lateral canthus of conjunctival hemorrhages present, no conjunctival injection.  Pupils are equal.  No hyphema OU. Moist oral mucosa  CARDIOVASCULAR/CHEST: RRR, no M/R/G. 2+ radial pulses  PULMONARY: Breathing comfortably on RA. Breath sounds are equal and clear to auscultation  ABDOMEN: non-distended. BS present, normoactive. Non-tender  MSK: moves all extremities, no deformities, no peripheral edema, no calf asymmetry  NEURO: Awake, alert, and oriented x 3. Face symmetric. Moves all extremities spontaneously. No focal neurologic deficits  SKIN: Warm, appears well-perfused  MENTAL STATUS: Normal affect      Labs and Imaging  Labs Reviewed   CBC AND DIFFERENTIAL - Abnormal       Result Value Ref Range Status    WBC 7.92  4.31 - 10.16 Thousand/uL Final    RBC 4.82  3.81 - 5.12 Million/uL Final    Hemoglobin 13.8  11.5 - 15.4 g/dL Final    Hematocrit 44.0  34.8 - 46.1 % Final    MCV 91  82 - 98 fL Final    MCH 28.6  26.8 - 34.3 pg Final    MCHC 31.4  31.4 - 37.4 g/dL Final    RDW 14.1  11.6 - 15.1 % Final    MPV 12.8 (*) 8.9 - 12.7 fL Final    Platelets 215  149 - 390 Thousands/uL Final    nRBC 0  /100 WBCs Final    Segmented % 57  43 - 75 % Final    Immature Grans % 1  0 - 2 % Final    Lymphocytes % 31  14 - 44 % Final    Monocytes % 7  4  - 12 % Final    Eosinophils Relative 3  0 - 6 % Final    Basophils Relative 1  0 - 1 % Final    Absolute Neutrophils 4.59  1.85 - 7.62 Thousands/µL Final    Absolute Immature Grans 0.04  0.00 - 0.20 Thousand/uL Final    Absolute Lymphocytes 2.47  0.60 - 4.47 Thousands/µL Final    Absolute Monocytes 0.56  0.17 - 1.22 Thousand/µL Final    Eosinophils Absolute 0.21  0.00 - 0.61 Thousand/µL Final    Basophils Absolute 0.05  0.00 - 0.10 Thousands/µL Final   COMPREHENSIVE METABOLIC PANEL - Abnormal    Sodium 142  135 - 147 mmol/L Final    Potassium 3.8  3.5 - 5.3 mmol/L Final    Chloride 107  96 - 108 mmol/L Final    CO2 29  21 - 32 mmol/L Final    ANION GAP 6  4 - 13 mmol/L Final    BUN 26 (*) 5 - 25 mg/dL Final    Creatinine 1.32 (*) 0.60 - 1.30 mg/dL Final    Comment: Standardized to IDMS reference method    Glucose 67  65 - 140 mg/dL Final    Comment: If the patient is fasting, the ADA then defines impaired fasting glucose as > 100 mg/dL and diabetes as > or equal to 123 mg/dL.    Calcium 8.8  8.4 - 10.2 mg/dL Final    AST 21  13 - 39 U/L Final    ALT 14  7 - 52 U/L Final    Comment: Specimen collection should occur prior to Sulfasalazine administration due to the potential for falsely depressed results.     Alkaline Phosphatase 84  34 - 104 U/L Final    Total Protein 7.2  6.4 - 8.4 g/dL Final    Albumin 4.4  3.5 - 5.0 g/dL Final    Total Bilirubin 0.55  0.20 - 1.00 mg/dL Final    Comment: Use of this assay is not recommended for patients undergoing treatment with eltrombopag due to the potential for falsely elevated results.  N-acetyl-p-benzoquinone imine (metabolite of Acetaminophen) will generate erroneously low results in samples for patients that have taken an overdose of Acetaminophen.    eGFR 38  ml/min/1.73sq m Final    Narrative:     National Kidney Disease Foundation guidelines for Chronic Kidney Disease (CKD):     Stage 1 with normal or high GFR (GFR > 90 mL/min/1.73 square meters)    Stage 2 Mild CKD (GFR  "= 60-89 mL/min/1.73 square meters)    Stage 3A Moderate CKD (GFR = 45-59 mL/min/1.73 square meters)    Stage 3B Moderate CKD (GFR = 30-44 mL/min/1.73 square meters)    Stage 4 Severe CKD (GFR = 15-29 mL/min/1.73 square meters)    Stage 5 End Stage CKD (GFR <15 mL/min/1.73 square meters)  Note: GFR calculation is accurate only with a steady state creatinine   UA W REFLEX TO MICROSCOPIC WITH REFLEX TO CULTURE - Abnormal    Color, UA Light Yellow   Final    Clarity, UA Clear   Final    Specific Gravity, UA 1.009  1.003 - 1.030 Final    pH, UA 5.5  4.5, 5.0, 5.5, 6.0, 6.5, 7.0, 7.5, 8.0 Final    Leukocytes, UA Moderate (*) Negative Final    Nitrite, UA Negative  Negative Final    Protein, UA 30 (1+) (*) Negative mg/dl Final    Glucose, UA Negative  Negative mg/dl Final    Ketones, UA Negative  Negative mg/dl Final    Urobilinogen, UA <2.0  <2.0 mg/dl mg/dl Final    Bilirubin, UA Negative  Negative Final    Occult Blood, UA Negative  Negative Final   URINE MICROSCOPIC - Abnormal    RBC, UA None Seen  None Seen, 1-2 /hpf Final    WBC, UA 2-4 (*) None Seen, 1-2 /hpf Final    Epithelial Cells Occasional  None Seen, Occasional /hpf Final    Bacteria, UA Occasional  None Seen, Occasional /hpf Final   HS TROPONIN I 0HR - Normal    hs TnI 0hr 5  \"Refer to ACS Flowchart\"- see link ng/L Final    Comment:                                              Initial (time 0) result  If >=50 ng/L, Myocardial injury suggested ;  Type of myocardial injury and treatment strategy  to be determined.  If 5-49 ng/L, a delta result at 2 hours and or 4 hours will be needed to further evaluate.  If <4 ng/L, and chest pain has been >3 hours since onset, patient may qualify for discharge based on the HEART score in the ED.  If <5 ng/L and <3hours since onset of chest pain, a delta result at 2 hours will be needed to further evaluate.    HS Troponin 99th Percentile URL of a Health Population=12 ng/L with a 95% Confidence Interval of 8-18 ng/L.    Second " "Troponin (time 2 hours)  If calculated delta >= 20 ng/L,  Myocardial injury suggested ; Type of myocardial injury and treatment strategy to be determined.  If 5-49 ng/L and the calculated delta is 5-19 ng/L, consult medical service for evaluation.  Continue evaluation for ischemia on ecg and other possible etiology and repeat hs troponin at 4 hours.  If delta is <5 ng/L at 2 hours, consider discharge based on risk stratification via the HEART score (if in ED), or CHRISTY risk score in IP/Observation.    HS Troponin 99th Percentile URL of a Health Population=12 ng/L with a 95% Confidence Interval of 8-18 ng/L.   HS TROPONIN I 2HR - Normal    hs TnI 2hr 5  \"Refer to ACS Flowchart\"- see link ng/L Final    Comment:                                              Initial (time 0) result  If >=50 ng/L, Myocardial injury suggested ;  Type of myocardial injury and treatment strategy  to be determined.  If 5-49 ng/L, a delta result at 2 hours and or 4 hours will be needed to further evaluate.  If <4 ng/L, and chest pain has been >3 hours since onset, patient may qualify for discharge based on the HEART score in the ED.  If <5 ng/L and <3hours since onset of chest pain, a delta result at 2 hours will be needed to further evaluate.    HS Troponin 99th Percentile URL of a Health Population=12 ng/L with a 95% Confidence Interval of 8-18 ng/L.    Second Troponin (time 2 hours)  If calculated delta >= 20 ng/L,  Myocardial injury suggested ; Type of myocardial injury and treatment strategy to be determined.  If 5-49 ng/L and the calculated delta is 5-19 ng/L, consult medical service for evaluation.  Continue evaluation for ischemia on ecg and other possible etiology and repeat hs troponin at 4 hours.  If delta is <5 ng/L at 2 hours, consider discharge based on risk stratification via the HEART score (if in ED), or CHRISTY risk score in IP/Observation.    HS Troponin 99th Percentile URL of a Health Population=12 ng/L with a 95% Confidence " Interval of 8-18 ng/L.    Delta 2hr hsTnI 0  <20 ng/L Final   POCT GLUCOSE - Normal    POC Glucose 91  65 - 140 mg/dl Final       No orders to display         Procedures  ECG 12 Lead Documentation Only    Date/Time: 8/11/2024 12:31 PM    Performed by: Erin Blue MD  Authorized by: Erin Blue MD    Comments:      Normal sinus rhythm, ventricular rate 61, parable 120, QRS 78, QTc 444, normal axis, LVH by aVL criteria, T wave inversions in high lateral leads, Q waves in lead V3 suggestive of age-indeterminate anteroseptal infarct, no prior EKG available for my review.          ED Course  Medications - No data to display    79-year-old female presenting with right eye conjunctival hemorrhage likely in setting of cough, as well as with transient room spinning/vertiginous sensation while in [employer] which is now resolved.  Vital signs reviewed, initially quite hypertensive, on repeat, blood pressure is 146/62.  Vital signs are otherwise within normal limits.  Differential diagnosis includes vertigo most likely peripheral in etiology and resolved at present.  Patient has no red flags such as focal neurologic deficits, persistence of symptoms, headache, nausea or vomiting.  Her EKG is as above, we do not have a prior EKG available for my review.  Troponin is 5 with delta of 0 making ACS unlikely.  Overall, patient's evaluation today is reassuring, I do not suspect the patient had a posterior circulation stroke leading to her symptoms today.  Concerning patient's right eye is of conjunctival hemorrhage, this should gradually resolve over the next 1 to 2 weeks.    Patient discharged to home with recommendations for symptom control, return precautions, and plan for follow up.

## 2024-08-12 ENCOUNTER — VBI (OUTPATIENT)
Dept: INTERNAL MEDICINE CLINIC | Facility: CLINIC | Age: 79
End: 2024-08-12

## 2024-08-12 NOTE — TELEPHONE ENCOUNTER
08/12/24 10:32 AM    Patient contacted post ED visit, first outreach attempt made. Message was left for patient to return a call to the VBI Department at [facility]: Phone [phone].    Thank you.  JOSE L LEMON MA  [facility]

## 2024-08-12 NOTE — LETTER
[facility] INTERNAL MEDICINE [facility]  [address]  [place] [address]  [phone]    Date: 08/14/24    Jcaklyn Angeles  [address] [facility] [address]    Dear Jacklyn:                                                                                                                                Thank you for choosing [facility] emergency department for care.  Your primary care provider wants to make sure that your ongoing medical care is being addressed. If you require follow up care as a result of your emergency department visit, there are a few things the practice would like you to know.                As part of the network's continuing commitment to caring for our patients, we have added more same day appointments and have extended office hours to meet your medical needs. After hours, on-call physicians are available via your primary care provider's main office line.               We encourage you to contact our office prior to seeking treatment to discuss your symptoms with the medical staff.  Together, we can determine the correct course of action.  A majority of non-emergent conditions such as: common cold, flu-like symptoms, fevers, strains/sprains, dislocations, minor burns, cuts and animal bites can be treated at [facility]. Diagnostic testing is available at some sites.               Of course, if you are experiencing a life threatening medical emergency call 911 or proceed directly to the nearest emergency room.    Your nearest [facility] facility is conveniently located at:    [address] [address]  [phone]  SKIP THE WAIT  Conveniently offered at most [facility] locations  [place] your spot online at [email or web address] or on the [facility]    Sincerely,    [facility] INTERNAL MEDICINE [facility]  Dept: [phone]

## 2024-08-13 NOTE — TELEPHONE ENCOUNTER
08/13/24 9:24 AM    Patient contacted post ED visit, second outreach attempt made. Message was left for patient to return a call to the VBI Department at Health system: Phone [phone].    Thank you.  JOSE L LEMON MA  [facility]

## 2024-08-14 NOTE — TELEPHONE ENCOUNTER
08/14/24 10:34 AM    Patient contacted post ED visit, phone outreaches were unsuccessful and a MyChart letter has been sent to the patient as follow-up.    Thank you.  JOSE L LEMON MA  [facility] V[facility]

## 2024-09-03 ENCOUNTER — EVALUATION (OUTPATIENT)
Dept: SPEECH THERAPY | Facility: CLINIC | Age: 79
End: 2024-09-03
Payer: COMMERCIAL

## 2024-09-03 DIAGNOSIS — G30.0 MILD EARLY ONSET ALZHEIMER'S DEMENTIA WITHOUT BEHAVIORAL DISTURBANCE, PSYCHOTIC DISTURBANCE, MOOD DISTURBANCE, OR ANXIETY (HCC): Primary | ICD-10-CM

## 2024-09-03 DIAGNOSIS — F02.A0 MILD EARLY ONSET ALZHEIMER'S DEMENTIA WITHOUT BEHAVIORAL DISTURBANCE, PSYCHOTIC DISTURBANCE, MOOD DISTURBANCE, OR ANXIETY (HCC): Primary | ICD-10-CM

## 2024-09-03 DIAGNOSIS — R48.8 OTHER SYMBOLIC DYSFUNCTIONS: ICD-10-CM

## 2024-09-03 PROCEDURE — 96125 COGNITIVE TEST BY HC PRO: CPT

## 2024-09-03 NOTE — PROGRESS NOTES
Speech-Language Pathology Initial Evaluation    Today's date: 9/3/2024  Patient’s name: Jacklyn Angeles  : 1945  MRN: [id number]  Safety measures: h/o CVA, HTN, dementia, depression, anxiety  Referring provider: No ref. provider found    Encounter Diagnosis     ICD-10-CM    1. 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, psychotic disturbance, mood disturbance, or anxiety ([facility])  G30.0     F02.A0       2. Other symptoms and signs involving cognitive functions following unspecified cerebrovascular disease  I69.918             Assessment: The patient exhibits moderate to severe cognitive-linguistic deficits, including declines in attention, memory, executive function, language, and visual-spatial skills. While the patient's strengths lie in attention and visual abilities, her performance in memory, executive function, and language tasks was notably low, which are consistent with her diagnosis of early onset Alzheimer’s.    It is recommended that the patient undergo cognitive-linguistic therapy to preserve her current cognitive abilities. Additionally, education on dementia and related topics is advised to enhance her overall care prognosis.       Short-term goals:  Patient and caregiver(s) will be educated on dementia and related topics for improved prognosis of care (to be achieved in 4-6 weeks).     Patient will participate in continued therapeutic trials in 90% of opportunities for continued assessment and recommendations for carryover and functional tasks at home (to be achieved in 4-6 weeks).     Patient and caregiver(s) will participate in the creation and use of a memory book with carryover in 4 out of 5 opportunities to facilitate improved overall function and quality of life (to be achieved in 4-6 weeks).     Patient will improve long-term memory retrieval and recall of information during reminiscing tasks with 80% accuracy provided external memory aid (to be achieved in 4-6  "weeks).     Long-term goals:  Patient will improve functional cognitive-linguistic skills with the implementation of cues and external aids (to be achieved by discharge).     Patient will demonstrate adequate memory/reasoning/judgment to perform desired activities in a supervised environment (to be achieved by discharge).     Patient's caregiver(s) will demonstrate implementation of memory book/aids into daily activities to assist with ADLs and improve quality of life (to be achieved by discharge).       Plan:  Patient would benefit from outpatient skilled Speech Therapy services: Cognitive-linguistic therapy    Frequency: 1-2x weekly  Duration: 3 months    Intervention certification from: 9/3/2024  Intervention certification to: 12/03/2024      Subjective:  History of present illness: Patient is a 79 y.o. female who was referred to outpatient skilled Speech Therapy services for a cognitive-linguistic evaluation. Patient with a significant PMH of CVA, HTN, HLD, depression, anxiety, dementia, arthritis, diverticulitis. Patient currently follows with Neurology. Of note, pt was previously evaluated for cognitive decline by SLP in [address] [place] [address] clinic and she recommended pt be treated by a Beninese speaking SLP as patient is primarily Beninese speaking. Per chart review,     \"01/10/2023: Memory issues unclear when first noticed. More long term memory rather than short term memory. Patient when cannot remember she starts hitting her head due to being in distress. She has times she forgot her coat. She forgets keep things secret and then when she was told that, she becomes distressed. Memory problem first noticed by patient but she is unsure when memory problems started. Daughter in law suspects possibly in a couple years. Memory loss seems gradual but there is paucity of information due to living with daughter in the past. Patient has difficulty with memory from the past like where she lived in the past and her family " information from the past such as dates and when she moved. Memory has been improving according to daughter in law especially after primary doctor removed the sertraline 50mg off of the medication list. Patient is fluent in speech with no word finding difficulties. She does not drive but when she was in [place] she drove but had times when she was on a familiar road she did not know where she is. She does have one time when she did not where she was and in [place] she did have 1-2 times when she was lost in her house. She had a time when she gave personal information to an unknown person online but she then realized what she did and then become emotionally distressed but unsure if patient makes stories up as well. Family assists for her financial capabilities, because she has episodes that seems like patient is confabulating for attention. According to daughter in law, patient does have emotional instability due to missing friends and family from [place] and is now in a new location. She doesn't need a walker or cane and have had no falls. No confusion, no seizures, no staring spells. Patient sleeping well after placing patient on remeron for sleep. Patient has polyuria which has been controlled by her medicine doctor and can control when she needs to urinate. Denies hallucinations. Mother and aunt may have had Alzheimer's in the past. Also states having shaking of both hands when she's anxious or when talking to her daughter and does not have these shaking otherwise. No pacing and no agitated behavior. No suspicious behaviors, feeling withdrawn. She has some numbness of the hands bilaterally intermittently which are occasional and no weakness. No vision problems. Patient says she has pain on back of the head which resolves with massaging of the head.    09/14/2023: Patient's memory has become worse with repetition of questions, stories compared to before. Patient has no trouble taking care of herself and lives with  "son and daughter in law. Patient is able to take medications by herself. No problems with speech. Patient is on aricept 10mg daily with no problems. Patient followed with Neuropsychology who states her memory problems are mulitfactorial from dementia and anxiety/depression. Patient does not drive. Patient continues to need assistance with finances. Otherwise no numbness, no weakness, no visual deficits, no ambulatory dysfunction. Patient has had trouble driving in [place].    03/07/2024: Patient is a 78 year old woman presenting for followup. Patient previously discussed about remeron and about adjusting the medication as patient has difficulty with sleep. Patient placed on aricept and added namenda 5mg BID for memory. Aspirin held previously due to petechial hemorrhages. Namenda subjectively worsened memory. Patient's memory was worsening until patient stopped taking the namenda more than a month. Patient became better in preparing for herself for today's visit. Patient still forgetful but no longer confused. Patient doing with ADLs. Patient continues to not drive and does not do her own finances. Patient has been continued on donepezil 10mg daily. No new vision problems, no new weakness, or gait dysfunction.\"    MRI brain NeuroQuant wo contrast completed on 06/11/2024. 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 Offered multiple modalities of supportive care for Alzheimer's. Patient currently on Aricept 10mg daily. I talked that we can increase aricept to 20mg daily but would need to talk about risk factors including cardiac and GI side effects. After weighing decision based on above, daughter in law want to hold off. Also " "recommended lifestyle modifications such as balanced diet, regular exercise, doing mentally stimulating activities, and socializations. Daughter in law agreeable. Also recommended cognitive therapy which can be done to help with the mind. Daughter in law agreeable. Also talked about future care of patient and need of assistance in the future. Daughter in law at this time currently managing patient's care well. Patient appreciative of call and had no further questions.\"    Today, the patient arrived at the clinic accompanied by her son and daughter-in-law. Her daughter-in-law went with her to the therapy room where she mentioned that her memory has been declining for a few years now. She often repeats herself and forgets things, but she is currently living with her son and daughter-in-law who help her with medication and daily activities.    Despite her memory issues, the patient is still quite independent at home, able to wake up, make breakfast, and carry out tasks throughout the day. She knows where things are in her home and manages quite well. Her daughter-in-law also noted that the memory decline started about five years ago.    The patient's main goal is to improve her memory, and both she and her daughter-in-law are aware of her early onset Alzheimer's diagnosis. They were informed that therapy will focus on maintaining her current cognitive function and providing them with memory strategies to help her compensate and be more independent and effective at home. Both the patient and her daughter-in-law expressed understanding and willingness to work on these goals.     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 This norm-referenced tool has been standardized on adults ages 18 through 89 years old with neurological impairment as a result of CVA, TBI, or dementia. The following results were obtained during the administration of the assessment.    Cognitive Domain: Score: Range of Severity:   Attention 127/215 Mild   Memory 95/185 Moderate   Executive Functions 7/40 Severe   Language  23/37 Moderate   Visuospatial Skills  42/105 Mild        *Composite Severity Ratin.0/4.0  Moderate             Clock Drawin/13 Moderate     Patient scored below Criterion Cut Scores on the following subtests: Personal Facts, Clock Drawing, Story Retelling, Symbol Trails, Design Memory, Mazes, and Design Generation.      *Patient named 13 concrete category members (animals) in 60 sec (norm=15+). -- BELOW AVERAGE    *Patient named 4 abstract category members (words beginning with letter 'm') in 60 sec (norm=10+). -- BELOW AVERAGE   Treatment:  N/A      Visit Tracking:  POC   Expires Auth Expiration Date ST Visit Limit   2024 PENDING BOMN          Visit/Unit Tracking:  Auth Status Date 24   PENDING Used 1    Remaining 1

## 2024-09-09 ENCOUNTER — APPOINTMENT (OUTPATIENT)
Dept: SPEECH THERAPY | Facility: CLINIC | Age: 79
End: 2024-09-09
Payer: COMMERCIAL

## 2024-09-19 DIAGNOSIS — G30.0 MILD EARLY ONSET ALZHEIMER'S DEMENTIA WITHOUT BEHAVIORAL DISTURBANCE, PSYCHOTIC DISTURBANCE, MOOD DISTURBANCE, OR ANXIETY (HCC): ICD-10-CM

## 2024-09-19 DIAGNOSIS — M12.811 ROTATOR CUFF ARTHROPATHY OF BOTH SHOULDERS: ICD-10-CM

## 2024-09-19 DIAGNOSIS — E78.2 MIXED HYPERLIPIDEMIA: ICD-10-CM

## 2024-09-19 DIAGNOSIS — F34.1 DYSTHYMIC DISORDER: ICD-10-CM

## 2024-09-19 DIAGNOSIS — M12.812 ROTATOR CUFF ARTHROPATHY OF BOTH SHOULDERS: ICD-10-CM

## 2024-09-19 DIAGNOSIS — F02.A0 MILD EARLY ONSET ALZHEIMER'S DEMENTIA WITHOUT BEHAVIORAL DISTURBANCE, PSYCHOTIC DISTURBANCE, MOOD DISTURBANCE, OR ANXIETY (HCC): ICD-10-CM

## 2024-09-19 RX ORDER — DONEPEZIL HYDROCHLORIDE 10 MG/1
10 TABLET, FILM COATED ORAL
Qty: 90 TABLET | Refills: 1 | Status: SHIPPED | OUTPATIENT
Start: 2024-09-19

## 2024-09-19 RX ORDER — ATORVASTATIN CALCIUM 20 MG/1
20 TABLET, FILM COATED ORAL DAILY
Qty: 90 TABLET | Refills: 1 | Status: SHIPPED | OUTPATIENT
Start: 2024-09-19

## 2024-09-19 RX ORDER — MIRTAZAPINE 15 MG/1
15 TABLET, FILM COATED ORAL
Qty: 90 TABLET | Refills: 1 | Status: SHIPPED | OUTPATIENT
Start: 2024-09-19

## 2024-09-19 RX ORDER — MELOXICAM 7.5 MG/1
7.5 TABLET ORAL DAILY
Qty: 90 TABLET | Refills: 1 | Status: SHIPPED | OUTPATIENT
Start: 2024-09-19

## 2024-10-19 ENCOUNTER — APPOINTMENT (OUTPATIENT)
Dept: LAB | Facility: CLINIC | Age: 79
End: 2024-10-19
Payer: COMMERCIAL

## 2024-10-19 DIAGNOSIS — E03.9 ACQUIRED HYPOTHYROIDISM: ICD-10-CM

## 2024-10-19 DIAGNOSIS — R73.01 IMPAIRED FASTING BLOOD SUGAR: ICD-10-CM

## 2024-10-19 DIAGNOSIS — E78.2 MIXED HYPERLIPIDEMIA: ICD-10-CM

## 2024-10-19 LAB
ALBUMIN SERPL BCG-MCNC: 4 G/DL (ref 3.5–5)
ALP SERPL-CCNC: 84 U/L (ref 34–104)
ALT SERPL W P-5'-P-CCNC: 13 U/L (ref 7–52)
ANION GAP SERPL CALCULATED.3IONS-SCNC: 8 MMOL/L (ref 4–13)
AST SERPL W P-5'-P-CCNC: 20 U/L (ref 13–39)
BASOPHILS # BLD AUTO: 0.04 THOUSANDS/ΜL (ref 0–0.1)
BASOPHILS NFR BLD AUTO: 1 % (ref 0–1)
BILIRUB SERPL-MCNC: 0.66 MG/DL (ref 0.2–1)
BUN SERPL-MCNC: 22 MG/DL (ref 5–25)
CALCIUM SERPL-MCNC: 8.6 MG/DL (ref 8.4–10.2)
CHLORIDE SERPL-SCNC: 106 MMOL/L (ref 96–108)
CHOLEST SERPL-MCNC: 133 MG/DL
CO2 SERPL-SCNC: 30 MMOL/L (ref 21–32)
CREAT SERPL-MCNC: 0.91 MG/DL (ref 0.6–1.3)
EOSINOPHIL # BLD AUTO: 0.17 THOUSAND/ΜL (ref 0–0.61)
EOSINOPHIL NFR BLD AUTO: 2 % (ref 0–6)
ERYTHROCYTE [DISTWIDTH] IN BLOOD BY AUTOMATED COUNT: 13.2 % (ref 11.6–15.1)
EST. AVERAGE GLUCOSE BLD GHB EST-MCNC: 103 MG/DL
GFR SERPL CREATININE-BSD FRML MDRD: 61 ML/MIN/1.73SQ M
GLUCOSE P FAST SERPL-MCNC: 89 MG/DL (ref 65–99)
HBA1C MFR BLD: 5.2 %
HCT VFR BLD AUTO: 39.7 % (ref 34.8–46.1)
HDLC SERPL-MCNC: 56 MG/DL
HGB BLD-MCNC: 12.6 G/DL (ref 11.5–15.4)
IMM GRANULOCYTES # BLD AUTO: 0.02 THOUSAND/UL (ref 0–0.2)
IMM GRANULOCYTES NFR BLD AUTO: 0 % (ref 0–2)
LDLC SERPL CALC-MCNC: 56 MG/DL (ref 0–100)
LYMPHOCYTES # BLD AUTO: 1.61 THOUSANDS/ΜL (ref 0.6–4.47)
LYMPHOCYTES NFR BLD AUTO: 21 % (ref 14–44)
MCH RBC QN AUTO: 29.6 PG (ref 26.8–34.3)
MCHC RBC AUTO-ENTMCNC: 31.7 G/DL (ref 31.4–37.4)
MCV RBC AUTO: 93 FL (ref 82–98)
MONOCYTES # BLD AUTO: 0.6 THOUSAND/ΜL (ref 0.17–1.22)
MONOCYTES NFR BLD AUTO: 8 % (ref 4–12)
NEUTROPHILS # BLD AUTO: 5.18 THOUSANDS/ΜL (ref 1.85–7.62)
NEUTS SEG NFR BLD AUTO: 68 % (ref 43–75)
NRBC BLD AUTO-RTO: 0 /100 WBCS
PLATELET # BLD AUTO: 189 THOUSANDS/UL (ref 149–390)
PMV BLD AUTO: 13.1 FL (ref 8.9–12.7)
POTASSIUM SERPL-SCNC: 3.7 MMOL/L (ref 3.5–5.3)
PROT SERPL-MCNC: 6.7 G/DL (ref 6.4–8.4)
RBC # BLD AUTO: 4.26 MILLION/UL (ref 3.81–5.12)
SODIUM SERPL-SCNC: 144 MMOL/L (ref 135–147)
TRIGL SERPL-MCNC: 104 MG/DL
TSH SERPL DL<=0.05 MIU/L-ACNC: 0.51 UIU/ML (ref 0.45–4.5)
WBC # BLD AUTO: 7.62 THOUSAND/UL (ref 4.31–10.16)

## 2024-10-19 PROCEDURE — 84443 ASSAY THYROID STIM HORMONE: CPT

## 2024-10-19 PROCEDURE — 80061 LIPID PANEL: CPT

## 2024-10-19 PROCEDURE — 80053 COMPREHEN METABOLIC PANEL: CPT

## 2024-10-19 PROCEDURE — 83036 HEMOGLOBIN GLYCOSYLATED A1C: CPT

## 2024-10-19 PROCEDURE — 36415 COLL VENOUS BLD VENIPUNCTURE: CPT

## 2024-10-31 ENCOUNTER — APPOINTMENT (OUTPATIENT)
Dept: LAB | Facility: CLINIC | Age: 79
End: 2024-10-31
Payer: COMMERCIAL

## 2024-10-31 ENCOUNTER — OFFICE VISIT (OUTPATIENT)
Dept: INTERNAL MEDICINE CLINIC | Facility: CLINIC | Age: 79
End: 2024-10-31
Payer: COMMERCIAL

## 2024-10-31 VITALS
HEIGHT: 59 IN | BODY MASS INDEX: 26 KG/M2 | WEIGHT: 129 LBS | TEMPERATURE: 97.8 F | OXYGEN SATURATION: 99 % | HEART RATE: 62 BPM | DIASTOLIC BLOOD PRESSURE: 78 MMHG | SYSTOLIC BLOOD PRESSURE: 122 MMHG

## 2024-10-31 DIAGNOSIS — E03.9 ACQUIRED HYPOTHYROIDISM: ICD-10-CM

## 2024-10-31 DIAGNOSIS — L30.9 DERMATITIS: ICD-10-CM

## 2024-10-31 DIAGNOSIS — F02.A0 MILD EARLY ONSET ALZHEIMER'S DEMENTIA WITHOUT BEHAVIORAL DISTURBANCE, PSYCHOTIC DISTURBANCE, MOOD DISTURBANCE, OR ANXIETY (HCC): ICD-10-CM

## 2024-10-31 DIAGNOSIS — L60.0 PARONYCHIA OF TOE OF RIGHT FOOT DUE TO INGROWN TOENAIL: ICD-10-CM

## 2024-10-31 DIAGNOSIS — N39.0 URINARY TRACT INFECTION WITHOUT HEMATURIA, SITE UNSPECIFIED: ICD-10-CM

## 2024-10-31 DIAGNOSIS — G30.0 MILD EARLY ONSET ALZHEIMER'S DEMENTIA WITHOUT BEHAVIORAL DISTURBANCE, PSYCHOTIC DISTURBANCE, MOOD DISTURBANCE, OR ANXIETY (HCC): ICD-10-CM

## 2024-10-31 DIAGNOSIS — E78.2 MIXED HYPERLIPIDEMIA: ICD-10-CM

## 2024-10-31 DIAGNOSIS — M12.811 ROTATOR CUFF ARTHROPATHY OF BOTH SHOULDERS: ICD-10-CM

## 2024-10-31 DIAGNOSIS — L03.031 PARONYCHIA OF TOE OF RIGHT FOOT DUE TO INGROWN TOENAIL: ICD-10-CM

## 2024-10-31 DIAGNOSIS — M12.812 ROTATOR CUFF ARTHROPATHY OF BOTH SHOULDERS: ICD-10-CM

## 2024-10-31 DIAGNOSIS — I10 BENIGN HYPERTENSION: Primary | ICD-10-CM

## 2024-10-31 DIAGNOSIS — Z23 ENCOUNTER FOR IMMUNIZATION: ICD-10-CM

## 2024-10-31 DIAGNOSIS — F34.1 DYSTHYMIC DISORDER: ICD-10-CM

## 2024-10-31 DIAGNOSIS — H57.12 PAIN OF LEFT EYE: ICD-10-CM

## 2024-10-31 DIAGNOSIS — R35.0 URINARY FREQUENCY: ICD-10-CM

## 2024-10-31 LAB
BACTERIA UR QL AUTO: ABNORMAL /HPF
BILIRUB UR QL STRIP: NEGATIVE
CLARITY UR: CLEAR
COLOR UR: ABNORMAL
GLUCOSE UR STRIP-MCNC: NEGATIVE MG/DL
HGB UR QL STRIP.AUTO: NEGATIVE
KETONES UR STRIP-MCNC: NEGATIVE MG/DL
LEUKOCYTE ESTERASE UR QL STRIP: NEGATIVE
NITRITE UR QL STRIP: NEGATIVE
NON-SQ EPI CELLS URNS QL MICRO: ABNORMAL /HPF
PH UR STRIP.AUTO: 6 [PH]
PROT UR STRIP-MCNC: ABNORMAL MG/DL
RBC #/AREA URNS AUTO: ABNORMAL /HPF
SP GR UR STRIP.AUTO: 1.01 (ref 1–1.03)
UROBILINOGEN UR STRIP-ACNC: <2 MG/DL
WBC #/AREA URNS AUTO: ABNORMAL /HPF

## 2024-10-31 PROCEDURE — 90662 IIV NO PRSV INCREASED AG IM: CPT

## 2024-10-31 PROCEDURE — 81001 URINALYSIS AUTO W/SCOPE: CPT

## 2024-10-31 PROCEDURE — 99214 OFFICE O/P EST MOD 30 MIN: CPT | Performed by: INTERNAL MEDICINE

## 2024-10-31 PROCEDURE — 87086 URINE CULTURE/COLONY COUNT: CPT

## 2024-10-31 PROCEDURE — G2211 COMPLEX E/M VISIT ADD ON: HCPCS | Performed by: INTERNAL MEDICINE

## 2024-10-31 PROCEDURE — G0008 ADMIN INFLUENZA VIRUS VAC: HCPCS

## 2024-10-31 RX ORDER — MUPIROCIN 20 MG/G
OINTMENT TOPICAL 3 TIMES DAILY
Qty: 30 G | Refills: 0 | Status: SHIPPED | OUTPATIENT
Start: 2024-10-31

## 2024-10-31 RX ORDER — CEPHALEXIN 500 MG/1
500 CAPSULE ORAL EVERY 12 HOURS SCHEDULED
Qty: 14 CAPSULE | Refills: 0 | Status: SHIPPED | OUTPATIENT
Start: 2024-10-31 | End: 2024-11-07

## 2024-10-31 RX ORDER — TRIAMCINOLONE ACETONIDE 1 MG/G
CREAM TOPICAL 2 TIMES DAILY
Qty: 80 G | Refills: 1 | Status: SHIPPED | OUTPATIENT
Start: 2024-10-31

## 2024-10-31 NOTE — PROGRESS NOTES
Assessment/Plan:             1. Benign hypertension  Comments:  stable, continue same med  2. Dermatitis  -     triamcinolone (KENALOG) 0.1 % cream; Apply topically 2 (two) times a day  3. Paronychia of toe of right foot due to ingrown toenail  -     mupirocin (BACTROBAN) 2 % ointment; Apply topically 3 (three) times a day  4. Pain of left eye  -     Ambulatory Referral to Ophthalmology; Future  5. Acquired hypothyroidism  6. 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 Dysthymic disorder  Comments:  continue same med  8. Mixed hyperlipidemia  Comments:  continue same med  9. Rotator cuff arthropathy of both shoulders  10. Urinary tract infection without hematuria, site unspecified  -     cephalexin (KEFLEX) 500 mg capsule; Take 1 capsule (500 mg total) by mouth every 12 (twelve) hours for 7 days  11. Urinary frequency  -     Urinalysis with microscopic; Future  -     Urine culture; Future         Subjective:      Patient ID: Jacklyn Angeles is a 79 y.o. female.    Follow-up on blood test done on 10/19/2024 test discussed with her        The following portions of the patient's history were reviewed and updated as appropriate: She  has a past medical history of Anxiety, Arthritis, Breast cancer ([facility]), Dementia ([facility]), Depression, Disease of thyroid gland, Diverticulitis of colon, History of chemotherapy, Hypertension, and Urinary incontinence.  She   Patient Active Problem List    Diagnosis Date Noted    Dermatitis 02/19/2024    Paronychia of toe of right foot due to ingrown toenail 02/19/2024    Stroke ([facility]) 09/14/2023    Depression, recurrent (HCC) 07/17/2023    Dysthymic disorder 01/16/2023    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, psychotic disturbance, mood disturbance, or anxiety (HCC) 01/16/2023    Moderate dementia (HCC) 01/10/2023    Rotator cuff arthropathy of both  shoulders 11/10/2022    Benign hypertension 11/10/2022    Mixed hyperlipidemia 11/10/2022    Acquired hypothyroidism 11/10/2022    Memory loss 11/10/2022     She  has a past surgical history that includes Breast surgery; Colon surgery; Knee surgery; and Breast lumpectomy (Left).  Her family history includes Dementia in her mother.  She  reports that she has never smoked. She has never been exposed to tobacco smoke. She has never used smokeless tobacco. She reports that she does not drink alcohol and does not use drugs.  Current Outpatient Medications   Medication Sig Dispense Refill    atorvastatin (LIPITOR) 20 mg tablet TAKE 1 TABLET EVERY DAY 90 tablet 1    cephalexin (KEFLEX) 500 mg capsule Take 1 capsule (500 mg total) by mouth every 12 (twelve) hours for 7 days 14 capsule 0    dicyclomine (BENTYL) 20 mg tablet Take 1 tablet (20 mg total) by mouth 2 (two) times a day as needed (abd pain) 20 tablet 0    donepezil (ARICEPT) 10 mg tablet TAKE 1 TABLET AT BEDTIME 90 tablet 1    irbesartan (AVAPRO) 150 mg tablet TAKE 1 TABLET AT BEDTIME 100 tablet 1    levothyroxine (Euthyrox) 112 mcg tablet Take 1 tablet (112 mcg total) by mouth daily in the early morning 90 tablet 3    meloxicam (MOBIC) 7.5 mg tablet TAKE 1 TABLET EVERY DAY 90 tablet 1    mirtazapine (REMERON) 15 mg tablet TAKE 1 TABLET AT BEDTIME 90 tablet 1    mupirocin (BACTROBAN) 2 % ointment Apply topically 3 (three) times a day 30 g 0    triamcinolone (KENALOG) 0.1 % cream Apply topically 2 (two) times a day 80 g 1    benzonatate (TESSALON PERLES) 100 mg capsule Take 1 capsule (100 mg total) by mouth 3 (three) times a day as needed for cough (Patient not taking: Reported on 2/19/2024) 20 capsule 0     No current facility-administered medications for this visit.     Current Outpatient Medications on File Prior to Visit   Medication Sig    atorvastatin (LIPITOR) 20 mg tablet TAKE 1 TABLET EVERY DAY    dicyclomine (BENTYL) 20 mg tablet Take 1 tablet (20 mg  "total) by mouth 2 (two) times a day as needed (abd pain)    donepezil (ARICEPT) 10 mg tablet TAKE 1 TABLET AT BEDTIME    irbesartan (AVAPRO) 150 mg tablet TAKE 1 TABLET AT BEDTIME    levothyroxine (Euthyrox) 112 mcg tablet Take 1 tablet (112 mcg total) by mouth daily in the early morning    meloxicam (MOBIC) 7.5 mg tablet TAKE 1 TABLET EVERY DAY    mirtazapine (REMERON) 15 mg tablet TAKE 1 TABLET AT BEDTIME    [DISCONTINUED] triamcinolone (KENALOG) 0.1 % cream APPLY TOPICALLY TO AFFECTED AREA(S) TWICE DAILY AS DIRECTED    benzonatate (TESSALON PERLES) 100 mg capsule Take 1 capsule (100 mg total) by mouth 3 (three) times a day as needed for cough (Patient not taking: Reported on 2/19/2024)    [DISCONTINUED] mupirocin (BACTROBAN) 2 % ointment Apply topically 3 (three) times a day (Patient not taking: Reported on 10/31/2024)     No current facility-administered medications on file prior to visit.     She has No Known Allergies..    Review of Systems   Constitutional:  Negative for chills and fever.   HENT:  Negative for congestion, ear pain and sore throat.    Eyes:  Negative for pain.   Respiratory:  Negative for cough and shortness of breath.    Cardiovascular:  Negative for chest pain and leg swelling.   Gastrointestinal:  Negative for abdominal pain, nausea and vomiting.   Endocrine: Negative for polyuria.   Genitourinary:  Negative for difficulty urinating, frequency and urgency.   Musculoskeletal:  Positive for arthralgias and back pain.   Skin:  Negative for rash.   Neurological:  Negative for weakness and headaches.   Psychiatric/Behavioral:  Negative for sleep disturbance. The patient is not nervous/anxious.          Objective:      /78 (BP Location: Left arm, Patient Position: Sitting, Cuff Size: Standard)   Pulse 62   Temp 97.8 °F (36.6 °C) (Temporal)   Ht 4' 11\" (1.499 m)   Wt 58.5 kg (129 lb)   SpO2 99%   BMI 26.05 kg/m²     Recent Results (from the past 1344 hour(s))   CBC and differential    " Collection Time: 10/19/24 10:49 AM   Result Value Ref Range    WBC 7.62 4.31 - 10.16 Thousand/uL    RBC 4.26 3.81 - 5.12 Million/uL    Hemoglobin 12.6 11.5 - 15.4 g/dL    Hematocrit 39.7 34.8 - 46.1 %    MCV 93 82 - 98 fL    MCH 29.6 26.8 - 34.3 pg    MCHC 31.7 31.4 - 37.4 g/dL    RDW 13.2 11.6 - 15.1 %    MPV 13.1 (H) 8.9 - 12.7 fL    Platelets 189 149 - 390 Thousands/uL    nRBC 0 /100 WBCs    Segmented % 68 43 - 75 %    Immature Grans % 0 0 - 2 %    Lymphocytes % 21 14 - 44 %    Monocytes % 8 4 - 12 %    Eosinophils Relative 2 0 - 6 %    Basophils Relative 1 0 - 1 %    Absolute Neutrophils 5.18 1.85 - 7.62 Thousands/µL    Absolute Immature Grans 0.02 0.00 - 0.20 Thousand/uL    Absolute Lymphocytes 1.61 0.60 - 4.47 Thousands/µL    Absolute Monocytes 0.60 0.17 - 1.22 Thousand/µL    Eosinophils Absolute 0.17 0.00 - 0.61 Thousand/µL    Basophils Absolute 0.04 0.00 - 0.10 Thousands/µL   Comprehensive metabolic panel    Collection Time: 10/19/24 10:49 AM   Result Value Ref Range    Sodium 144 135 - 147 mmol/L    Potassium 3.7 3.5 - 5.3 mmol/L    Chloride 106 96 - 108 mmol/L    CO2 30 21 - 32 mmol/L    ANION GAP 8 4 - 13 mmol/L    BUN 22 5 - 25 mg/dL    Creatinine 0.91 0.60 - 1.30 mg/dL    Glucose, Fasting 89 65 - 99 mg/dL    Calcium 8.6 8.4 - 10.2 mg/dL    AST 20 13 - 39 U/L    ALT 13 7 - 52 U/L    Alkaline Phosphatase 84 34 - 104 U/L    Total Protein 6.7 6.4 - 8.4 g/dL    Albumin 4.0 3.5 - 5.0 g/dL    Total Bilirubin 0.66 0.20 - 1.00 mg/dL    eGFR 61 ml/min/1.73sq m   Lipid Panel with Direct LDL reflex    Collection Time: 10/19/24 10:49 AM   Result Value Ref Range    Cholesterol 133 See Comment mg/dL    Triglycerides 104 See Comment mg/dL    HDL, Direct 56 >=50 mg/dL    LDL Calculated 56 0 - 100 mg/dL   TSH, 3rd generation    Collection Time: 10/19/24 10:49 AM   Result Value Ref Range    TSH 3RD GENERATON 0.510 0.450 - 4.500 uIU/mL   Hemoglobin A1C    Collection Time: 10/19/24 10:49 AM   Result Value Ref Range     Hemoglobin A1C 5.2 Normal 4.0-5.6%; PreDiabetic 5.7-6.4%; Diabetic >=6.5%; Glycemic control for adults with diabetes <7.0% %     mg/dl        Physical Exam  Constitutional:       Appearance: Normal appearance.   HENT:      Head: Normocephalic.      Right Ear: External ear normal.      Left Ear: External ear normal.      Nose: Nose normal. No congestion.      Mouth/Throat:      Mouth: Mucous membranes are moist.      Pharynx: Oropharynx is clear. No oropharyngeal exudate or posterior oropharyngeal erythema.   Eyes:      Extraocular Movements: Extraocular movements intact.      Conjunctiva/sclera: Conjunctivae normal.      Pupils: Pupils are equal, round, and reactive to light.   Cardiovascular:      Rate and Rhythm: Normal rate and regular rhythm.      Heart sounds: Normal heart sounds. No murmur heard.  Pulmonary:      Effort: Pulmonary effort is normal.      Breath sounds: Normal breath sounds. No wheezing or rales.   Abdominal:      General: Abdomen is flat. There is no distension.      Palpations: Abdomen is soft.      Tenderness: There is no abdominal tenderness.   Musculoskeletal:      Cervical back: Normal range of motion and neck supple.      Right lower leg: No edema.      Left lower leg: No edema.   Lymphadenopathy:      Cervical: No cervical adenopathy.   Skin:     General: Skin is warm.   Neurological:      General: No focal deficit present.      Mental Status: She is alert and oriented to person, place, and time.

## 2024-11-01 LAB — BACTERIA UR CULT: NORMAL

## 2024-12-01 DIAGNOSIS — I10 BENIGN HYPERTENSION: ICD-10-CM

## 2024-12-03 RX ORDER — IRBESARTAN 150 MG/1
150 TABLET ORAL
Qty: 90 TABLET | Refills: 1 | Status: SHIPPED | OUTPATIENT
Start: 2024-12-03

## 2024-12-30 ENCOUNTER — TELEPHONE (OUTPATIENT)
Age: 79
End: 2024-12-30

## 2024-12-30 NOTE — TELEPHONE ENCOUNTER
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    Please contact to advise of an update once one becomes available.

## 2024-12-31 DIAGNOSIS — R21 SKIN RASH: Primary | ICD-10-CM

## 2025-01-20 ENCOUNTER — RA CDI HCC (OUTPATIENT)
Dept: OTHER | Facility: HOSPITAL | Age: 80
End: 2025-01-20

## 2025-01-20 DIAGNOSIS — Z86.73 HISTORY OF STROKE: Primary | ICD-10-CM

## 2025-01-27 ENCOUNTER — OFFICE VISIT (OUTPATIENT)
Dept: INTERNAL MEDICINE CLINIC | Facility: CLINIC | Age: 80
End: 2025-01-27
Payer: COMMERCIAL

## 2025-01-27 VITALS
WEIGHT: 128 LBS | BODY MASS INDEX: 25.8 KG/M2 | SYSTOLIC BLOOD PRESSURE: 134 MMHG | DIASTOLIC BLOOD PRESSURE: 82 MMHG | TEMPERATURE: 97.8 F | OXYGEN SATURATION: 99 % | HEART RATE: 58 BPM | HEIGHT: 59 IN

## 2025-01-27 DIAGNOSIS — G30.0 MILD EARLY ONSET ALZHEIMER'S DEMENTIA WITHOUT BEHAVIORAL DISTURBANCE, PSYCHOTIC DISTURBANCE, MOOD DISTURBANCE, OR ANXIETY (HCC): ICD-10-CM

## 2025-01-27 DIAGNOSIS — L98.9 SKIN DISORDER: ICD-10-CM

## 2025-01-27 DIAGNOSIS — F34.1 DYSTHYMIC DISORDER: ICD-10-CM

## 2025-01-27 DIAGNOSIS — L84 PRE-ULCERATIVE CORN OR CALLOUS: Primary | ICD-10-CM

## 2025-01-27 DIAGNOSIS — H57.9 EYE DISORDER: ICD-10-CM

## 2025-01-27 DIAGNOSIS — Z00.00 MEDICARE ANNUAL WELLNESS VISIT, SUBSEQUENT: ICD-10-CM

## 2025-01-27 DIAGNOSIS — E78.2 MIXED HYPERLIPIDEMIA: ICD-10-CM

## 2025-01-27 DIAGNOSIS — F33.9 DEPRESSION, RECURRENT (HCC): ICD-10-CM

## 2025-01-27 DIAGNOSIS — F02.A0 MILD EARLY ONSET ALZHEIMER'S DEMENTIA WITHOUT BEHAVIORAL DISTURBANCE, PSYCHOTIC DISTURBANCE, MOOD DISTURBANCE, OR ANXIETY (HCC): ICD-10-CM

## 2025-01-27 DIAGNOSIS — I10 BENIGN HYPERTENSION: ICD-10-CM

## 2025-01-27 DIAGNOSIS — E53.8 B12 DEFICIENCY: ICD-10-CM

## 2025-01-27 DIAGNOSIS — R73.01 IMPAIRED FASTING BLOOD SUGAR: ICD-10-CM

## 2025-01-27 PROCEDURE — G0439 PPPS, SUBSEQ VISIT: HCPCS | Performed by: INTERNAL MEDICINE

## 2025-01-27 PROCEDURE — 99214 OFFICE O/P EST MOD 30 MIN: CPT | Performed by: INTERNAL MEDICINE

## 2025-01-27 NOTE — PROGRESS NOTES
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, psychotic disturbance, mood disturbance, or anxiety (HCC)         Dysthymic disorder           Mixed hyperlipidemia    Orders:    Comprehensive metabolic panel; Future    Lipid Panel with Direct LDL reflex; Future    TSH, 3rd generation; Future    Medicare annual wellness visit, subsequent         Impaired fasting blood sugar    Orders:    Hemoglobin A1C; Future    B12 deficiency    Orders:    Vitamin B12; Future    Depression, recurrent (HCC)           Skin disorder    Orders:    Ambulatory Referral to Ophthalmology; Future    Eye disorder    Orders:    Ambulatory Referral to Dermatology; Future       Preventive health issues were discussed with patient, and age appropriate screening tests were ordered as noted in patient's After Visit Summary. Personalized health advice and appropriate referrals for health education or preventive services given if needed, as noted in patient's After Visit Summary.    History of Present Illness     Right foot pain, also medical wellness exam, follow-up on multiple medical problems today are stable       Patient Care Team:  Ez Ruiz MD as PCP - General (Internal Medicine)    Review of Systems   Constitutional:  Negative for chills and fever.   HENT:  Negative for congestion, ear pain and sore throat.    Eyes:  Negative for pain.   Respiratory:  Negative for cough and shortness of breath.    Cardiovascular:  Negative for chest pain and leg swelling.   Gastrointestinal:   Negative for abdominal pain, nausea and vomiting.   Endocrine: Negative for polyuria.   Genitourinary:  Negative for difficulty urinating, frequency and urgency.   Musculoskeletal:  Positive for arthralgias. Negative for back pain.   Skin:  Negative for rash.   Neurological:  Negative for weakness and headaches.   Psychiatric/Behavioral:  Negative for sleep disturbance. The patient is not nervous/anxious.      Medical History Reviewed by provider this encounter:  Tobacco  Allergies  Meds  Problems  Med Hx  Surg Hx  Fam Hx       Annual Wellness Visit Questionnaire   Jacklyn is here for her Subsequent Wellness visit. Last Medicare Wellness visit information reviewed, patient interviewed and updates made to the record today.      Health Risk Assessment:   Patient rates overall health as good. Patient feels that their physical health rating is same. Patient is satisfied with their life. Eyesight was rated as slightly worse. Hearing was rated as slightly worse. Patient feels that their emotional and mental health rating is slightly better. Patients states they are never, rarely angry. Patient states they are sometimes unusually tired/fatigued. Pain experienced in the last 7 days has been none. Patient states that she has experienced no weight loss or gain in last 6 months.     Depression Screening:   PHQ-9 Score: 0      Fall Risk Screening:   In the past year, patient has experienced: no history of falling in past year      Urinary Incontinence Screening:   Patient has not leaked urine accidently in the last six months.     Home Safety:  Patient does not have trouble with stairs inside or outside of their home. Patient has working smoke alarms and has working carbon monoxide detector. Home safety hazards include: none.     Nutrition:   Current diet is Regular.     Medications:   Patient is not currently taking any over-the-counter supplements. Patient is able to manage medications.     Activities of Daily Living  (ADLs)/Instrumental Activities of Daily Living (IADLs):   Walk and transfer into and out of bed and chair?: Yes  Dress and groom yourself?: Yes    Bathe or shower yourself?: Yes    Feed yourself? Yes  Do your laundry/housekeeping?: No  Manage your money, pay your bills and track your expenses?: No  Make your own meals?: No    Do your own shopping?: Yes    Previous Hospitalizations:   Any hospitalizations or ED visits within the last 12 months?: Yes    How many hospitalizations have you had in the last year?: 1-2    Advance Care Planning:   Living will: No    Durable POA for healthcare: Yes    Advanced directive: No    ACP document given: Yes      Cognitive Screening:   Provider or family/friend/caregiver concerned regarding cognition?: Yes    PREVENTIVE SCREENINGS      Cardiovascular Screening:    General: Screening Not Indicated and History Lipid Disorder      Diabetes Screening:     General: Screening Current      Breast Cancer Screening:     General: History Breast Cancer      Cervical Cancer Screening:    General: Screening Not Indicated      Lung Cancer Screening:     General: Screening Not Indicated    Screening, Brief Intervention, and Referral to Treatment (SBIRT)    Screening  Typical number of drinks in a day: 0  Typical number of drinks in a week: 0  Interpretation: Low risk drinking behavior.    Single Item Drug Screening:  How often have you used an illegal drug (including marijuana) or a prescription medication for non-medical reasons in the past year? never    Single Item Drug Screen Score: 0  Interpretation: Negative screen for possible drug use disorder    Social Drivers of Health     Financial Resource Strain: Low Risk  (1/19/2024)    Overall Financial Resource Strain (CARDIA)     Difficulty of Paying Living Expenses: Not very hard   Food Insecurity: No Food Insecurity (1/27/2025)    Hunger Vital Sign     Worried About Running Out of Food in the Last Year: Never true     Ran Out of Food in the Last  "Year: Never true   Transportation Needs: No Transportation Needs (1/27/2025)    PRAPARE - Transportation     Lack of Transportation (Medical): No     Lack of Transportation (Non-Medical): No   Housing Stability: Low Risk  (1/27/2025)    Housing Stability Vital Sign     Unable to Pay for Housing in the Last Year: No     Number of Times Moved in the Last Year: 0     Homeless in the Last Year: No   Utilities: Not At Risk (1/27/2025)    [facility] Utilities     Threatened with loss of utilities: No     No results found.    Objective   /82 (BP Location: Left arm, Patient Position: Sitting, Cuff Size: Standard)   Pulse 58   Temp 97.8 °F (36.6 °C) (Temporal)   Ht 4' 11\" (1.499 m)   Wt 58.1 kg (128 lb)   SpO2 99%   BMI 25.85 kg/m²     Physical Exam  Vitals and nursing note reviewed.   Constitutional:       General: She is not in acute distress.     Appearance: She is well-developed.   HENT:      Head: Normocephalic and atraumatic.      Right Ear: External ear normal.      Left Ear: External ear normal.      Mouth/Throat:      Mouth: Mucous membranes are moist.      Pharynx: Oropharynx is clear.   Eyes:      Extraocular Movements: Extraocular movements intact.      Conjunctiva/sclera: Conjunctivae normal.   Cardiovascular:      Rate and Rhythm: Normal rate and regular rhythm.      Heart sounds: Normal heart sounds. No murmur heard.  Pulmonary:      Effort: Pulmonary effort is normal. No respiratory distress.      Breath sounds: Normal breath sounds. No wheezing or rales.   Abdominal:      General: Abdomen is flat. There is no distension.      Palpations: Abdomen is soft.      Tenderness: There is no abdominal tenderness.   Musculoskeletal:         General: No swelling.      Cervical back: Neck supple.      Right lower leg: No edema.      Left lower leg: No edema.   Skin:     General: Skin is warm and dry.      Capillary Refill: Capillary refill takes less than 2 seconds.   Neurological:      General: No focal deficit " present.      Mental Status: She is alert.   Psychiatric:         Mood and Affect: Mood normal.

## 2025-02-14 DIAGNOSIS — E78.2 MIXED HYPERLIPIDEMIA: ICD-10-CM

## 2025-02-14 DIAGNOSIS — M12.811 ROTATOR CUFF ARTHROPATHY OF BOTH SHOULDERS: ICD-10-CM

## 2025-02-14 DIAGNOSIS — M12.812 ROTATOR CUFF ARTHROPATHY OF BOTH SHOULDERS: ICD-10-CM

## 2025-02-14 DIAGNOSIS — F34.1 DYSTHYMIC DISORDER: ICD-10-CM

## 2025-02-14 DIAGNOSIS — F02.A0 MILD EARLY ONSET ALZHEIMER'S DEMENTIA WITHOUT BEHAVIORAL DISTURBANCE, PSYCHOTIC DISTURBANCE, MOOD DISTURBANCE, OR ANXIETY (HCC): ICD-10-CM

## 2025-02-14 DIAGNOSIS — G30.0 MILD EARLY ONSET ALZHEIMER'S DEMENTIA WITHOUT BEHAVIORAL DISTURBANCE, PSYCHOTIC DISTURBANCE, MOOD DISTURBANCE, OR ANXIETY (HCC): ICD-10-CM

## 2025-02-14 RX ORDER — MELOXICAM 7.5 MG/1
7.5 TABLET ORAL DAILY
Qty: 90 TABLET | Refills: 1 | Status: SHIPPED | OUTPATIENT
Start: 2025-02-14

## 2025-02-14 RX ORDER — ATORVASTATIN CALCIUM 20 MG/1
20 TABLET, FILM COATED ORAL DAILY
Qty: 90 TABLET | Refills: 1 | Status: SHIPPED | OUTPATIENT
Start: 2025-02-14

## 2025-02-14 RX ORDER — MIRTAZAPINE 15 MG/1
15 TABLET, FILM COATED ORAL
Qty: 90 TABLET | Refills: 1 | Status: SHIPPED | OUTPATIENT
Start: 2025-02-14

## 2025-02-14 RX ORDER — DONEPEZIL HYDROCHLORIDE 10 MG/1
10 TABLET, FILM COATED ORAL
Qty: 90 TABLET | Refills: 1 | Status: SHIPPED | OUTPATIENT
Start: 2025-02-14

## 2025-02-21 DIAGNOSIS — L60.0 PARONYCHIA OF TOE OF RIGHT FOOT DUE TO INGROWN TOENAIL: ICD-10-CM

## 2025-02-21 DIAGNOSIS — L03.031 PARONYCHIA OF TOE OF RIGHT FOOT DUE TO INGROWN TOENAIL: ICD-10-CM

## 2025-02-24 RX ORDER — MUPIROCIN 20 MG/G
OINTMENT TOPICAL 3 TIMES DAILY
Qty: 22 G | Refills: 11 | Status: SHIPPED | OUTPATIENT
Start: 2025-02-24

## 2025-03-13 ENCOUNTER — CONSULT (OUTPATIENT)
Age: 80
End: 2025-03-13

## 2025-03-13 VITALS — WEIGHT: 134 LBS | BODY MASS INDEX: 27.06 KG/M2

## 2025-03-13 DIAGNOSIS — D22.60 MULTIPLE BENIGN NEVI OF UPPER EXTREMITY, LOWER EXTREMITY, AND TRUNK: ICD-10-CM

## 2025-03-13 DIAGNOSIS — H01.131 ECZEMATOUS DERMATITIS OF RIGHT UPPER EYELID: Primary | ICD-10-CM

## 2025-03-13 DIAGNOSIS — L82.1 SEBORRHEIC KERATOSIS: ICD-10-CM

## 2025-03-13 DIAGNOSIS — D22.5 MULTIPLE BENIGN NEVI OF UPPER EXTREMITY, LOWER EXTREMITY, AND TRUNK: ICD-10-CM

## 2025-03-13 DIAGNOSIS — D22.70 MULTIPLE BENIGN NEVI OF UPPER EXTREMITY, LOWER EXTREMITY, AND TRUNK: ICD-10-CM

## 2025-03-13 DIAGNOSIS — L81.4 SOLAR LENTIGO: ICD-10-CM

## 2025-03-13 DIAGNOSIS — D18.01 CHERRY ANGIOMA: ICD-10-CM

## 2025-03-13 DIAGNOSIS — R21 SKIN RASH: ICD-10-CM

## 2025-03-13 RX ORDER — HYDROCORTISONE 25 MG/G
CREAM TOPICAL 2 TIMES DAILY
Qty: 28 G | Refills: 2 | Status: SHIPPED | OUTPATIENT
Start: 2025-03-13

## 2025-03-13 NOTE — PROGRESS NOTES
"[facility] Dermatology Clinic Note     Patient Name: Jacklyn Angeles  Encounter Date: 03/13/2025     Have you been cared for by a [facility] Dermatologist in the last 3 years and, if so, which description applies to you?    NO.   I am considered a \"new\" patient and must complete all patient intake questions. I am FEMALE/of child-bearing potential.    REVIEW OF SYSTEMS:  Have you recently had or currently have any of the following? Recent fever or chills? No  Any non-healing wound? No  Are you pregnant or planning to become pregnant? No  Are you currently or planning to be nursing or breast feeding? No   PAST MEDICAL HISTORY:  Have you personally ever had or currently have any of the following?  If \"YES,\" then please provide more detail. Skin cancer (such as Melanoma, Basal Cell Carcinoma, Squamous Cell Carcinoma?  YES, Skin cancer unknown   Tuberculosis, HIV/AIDS, Hepatitis B or C: No  Radiation Treatment No   HISTORY OF IMMUNOSUPPRESSION:   Do you have a history of any of the following:  Systemic Immunosuppression such as Diabetes, Biologic or Immunotherapy, Chemotherapy, Organ Transplantation, Bone Marrow Transplantation or Prednsione?  YES, Breast cancer.     Answering \"YES\" requires the addition of the dotphrase \"IMMUNOSUPPRESSED\" as the first diagnosis of the patient's visit.   FAMILY HISTORY:  Any \"first degree relatives\" (parent, brother, sister, or child) with the following?    Skin Cancer, Pancreatic or Other Cancer? No   PATIENT EXPERIENCE:    Do you want the Dermatologist to perform a COMPLETE skin exam today including a clinical examination under the \"bra and underwear\" areas?  Yes  If necessary, do we have your permission to call and leave a detailed message on your Preferred Phone number that includes your specific medical information?  Yes      No Known Allergies   Current Outpatient Medications:   •  atorvastatin (LIPITOR) 20 mg tablet, TAKE 1 TABLET EVERY DAY, Disp: 90 tablet, Rfl: 1  •  dicyclomine " (BENTYL) 20 mg tablet, Take 1 tablet (20 mg total) by mouth 2 (two) times a day as needed (abd pain), Disp: 20 tablet, Rfl: 0  •  donepezil (ARICEPT) 10 mg tablet, TAKE 1 TABLET AT BEDTIME, Disp: 90 tablet, Rfl: 1  •  irbesartan (AVAPRO) 150 mg tablet, TAKE 1 TABLET AT BEDTIME, Disp: 90 tablet, Rfl: 1  •  levothyroxine (Euthyrox) 112 mcg tablet, Take 1 tablet (112 mcg total) by mouth daily in the early morning, Disp: 90 tablet, Rfl: 3  •  meloxicam (MOBIC) 7.5 mg tablet, TAKE 1 TABLET EVERY DAY, Disp: 90 tablet, Rfl: 1  •  mirtazapine (REMERON) 15 mg tablet, TAKE 1 TABLET AT BEDTIME, Disp: 90 tablet, Rfl: 1  •  mupirocin (BACTROBAN) 2 % ointment, APPLY TOPICALLY 3 (THREE) TIMES A DAY, Disp: 22 g, Rfl: 11  •  triamcinolone (KENALOG) 0.1 % cream, Apply topically 2 (two) times a day, Disp: 80 g, Rfl: 1  •  benzonatate (TESSALON PERLES) 100 mg capsule, Take 1 capsule (100 mg total) by mouth 3 (three) times a day as needed for cough (Patient not taking: Reported on 2/19/2024), Disp: 20 capsule, Rfl: 0          Whom besides the patient is providing clinical information about today's encounter?   NO ADDITIONAL HISTORIAN (patient alone provided history)    Physical Exam and Assessment/Plan by Diagnosis:    SEBORRHEIC KERATOSES  - Relevant exam: Scattered over the back, trunk/extremities are waxy brown to black plaques and papules with stuck on appearance  - Exam and clinical history consistent with seborrheic keratoses  - Counseled that these are benign growths that do not require treatment  - Counseled that removal of lesions is considered cosmetic and so would incur a fee should patient elect to move forward.   - Patient to hold on treatments for now but will inform us should they desire additional treatments    MELANOCYTIC NEVI  -Relevant exam: Scattered over the trunk/extremities are homogenously pigmented brown macules and papules. ELM performed and without concerning findings.  - Exam and clinical history consistent  with melanocytic nevi  - Educated on the ABCDE's of melanoma; handout provided  - Counseled to return to clinic prior to scheduled appointment should any of these lesions change or should any new lesions of concern arise  - Counseled on use of sun protection daily. Reviewed latest FDA sunscreen guidelines, including use of broad spectrum (UVA and UVB blocking) sunscreen or sun protective clothing with SPF 30-50 every 2-3 hours and reapplied after exposure to water; use of photoprotective clothing, including a broad brim hat and UPF rated clothing if outdoors for several hours; avoid use of tanning beds as these pose significant risk for melanoma and skin cancer.    LENTIGINES  OTHER SKIN CHANGES DUE TO CHRONIC EXPOSURE TO NONIONIZING RADIATION  - Relevant exam: Over sun exposed areas of the chest arms and back are brown macules. ELM performed and without concerning findings.  - Exam and clinical history consistent with lentigines.  - Educated that these are indicative of prior sun exposure.   - Counseled to return to clinic prior to scheduled appointment should any of these lesions change or should any new lesions of concern arise.  - Recommended use of sunscreen as above and below.  - Counseled on use of sun protection daily. Reviewed latest FDA sunscreen guidelines, including use of broad spectrum (UVA and UVB blocking) sunscreen or sun protective clothing with SPF 30-50 every 2-3 hours and reapplied after exposure to water; use of photoprotective clothing, including a broad brim hat and UPF rated clothing if outdoors for several hours; avoid use of tanning beds as these pose significant risk for melanoma and skin cancer.    CHERRY ANGIOMAS  - Relevant exam: Scattered over the trunk/extremities are red papules  - Exam and clinical history consistent with cherry angiomas  - Educated that these are benign  - Educated that removal is considered aesthetic and would incur a fee.  - Patient does not wish to pursue  removal at this time but will contact us should this change.    EYELID DERMATITIS   Physical Exam:  Anatomic Location Affected:  right medial right upper eyelid   Morphological Description:  Erythema and scale  Pertinent Positives:  Pertinent Negatives:    Additional History of Present Condition:  present for a couple months, patient denies treatments.     Assessment and Plan:  Based on a thorough discussion of this condition and the management approach to it (including a comprehensive discussion of the known risks, side effects and potential benefits of treatment), the patient (family) agrees to implement the following specific plan:  Hydrocortisone 2.5 % cream,  twice daily for up to 2 week.     Scribe Attestation    I,:  Mary Jauregui MA am acting as a scribe while in the presence of the attending physician.:       I,:  Amber Osorio MD personally performed the services described in this documentation    as scribed in my presence.:

## 2025-03-13 NOTE — PATIENT INSTRUCTIONS
SEBORRHEIC KERATOSES  - Relevant exam: Scattered over the backtrunk/extremities are waxy brown to black plaques and papules with stuck on appearance  - Exam and clinical history consistent with seborrheic keratoses  - Counseled that these are benign growths that do not require treatment  - Counseled that removal of lesions is considered cosmetic and so would incur a fee should patient elect to move forward.   - Patient to hold on treatments for now but will inform us should they desire additional treatments    MELANOCYTIC NEVI  -Relevant exam: Scattered over the trunk/extremities are homogenously pigmented brown macules and papules. ELM performed and without concerning findings.  - Exam and clinical history consistent with melanocytic nevi  - Educated on the ABCDE's of melanoma; handout provided  - Counseled to return to clinic prior to scheduled appointment should any of these lesions change or should any new lesions of concern arise  - Counseled on use of sun protection daily. Reviewed latest FDA sunscreen guidelines, including use of broad spectrum (UVA and UVB blocking) sunscreen or sun protective clothing with SPF 30-50 every 2-3 hours and reapplied after exposure to water; use of photoprotective clothing, including a broad brim hat and UPF rated clothing if outdoors for several hours; avoid use of tanning beds as these pose significant risk for melanoma and skin cancer.    LENTIGINES  OTHER SKIN CHANGES DUE TO CHRONIC EXPOSURE TO NONIONIZING RADIATION  - Relevant exam: Over sun exposed areas of the chest arms and back are brown macules. ELM performed and without concerning findings.  - Exam and clinical history consistent with lentigines.  - Educated that these are indicative of prior sun exposure.   - Counseled to return to clinic prior to scheduled appointment should any of these lesions change or should any new lesions of concern arise.  - Recommended use of sunscreen as above and below.  - Counseled on use  of sun protection daily. Reviewed latest FDA sunscreen guidelines, including use of broad spectrum (UVA and UVB blocking) sunscreen or sun protective clothing with SPF 30-50 every 2-3 hours and reapplied after exposure to water; use of photoprotective clothing, including a broad brim hat and UPF rated clothing if outdoors for several hours; avoid use of tanning beds as these pose significant risk for melanoma and skin cancer.    CHERRY ANGIOMAS  - Relevant exam: Scattered over the trunk/extremities are red papules  - Exam and clinical history consistent with cherry angiomas  - Educated that these are benign  - Educated that removal is considered aesthetic and would incur a fee.  - Patient does not wish to pursue removal at this time but will contact us should this change.    EYELID DERMATITIS     Assessment and Plan:  Based on a thorough discussion of this condition and the management approach to it (including a comprehensive discussion of the known risks, side effects and potential benefits of treatment), the patient (family) agrees to implement the following specific plan:  Hydrocortisone 2.5 % cream,  twice daily for up to 2 week.

## 2025-03-17 DIAGNOSIS — L30.9 DERMATITIS: ICD-10-CM

## 2025-03-17 RX ORDER — TRIAMCINOLONE ACETONIDE 1 MG/G
CREAM TOPICAL 2 TIMES DAILY
Qty: 80 G | Refills: 5 | Status: SHIPPED | OUTPATIENT
Start: 2025-03-17

## 2025-04-09 DIAGNOSIS — E03.9 ACQUIRED HYPOTHYROIDISM: ICD-10-CM

## 2025-04-09 RX ORDER — LEVOTHYROXINE SODIUM 112 UG/1
TABLET ORAL
Qty: 90 TABLET | Refills: 1 | Status: SHIPPED | OUTPATIENT
Start: 2025-04-09

## 2025-04-28 DIAGNOSIS — I10 BENIGN HYPERTENSION: ICD-10-CM

## 2025-04-28 RX ORDER — IRBESARTAN 150 MG/1
150 TABLET ORAL
Qty: 90 TABLET | Refills: 3 | Status: SHIPPED | OUTPATIENT
Start: 2025-04-28

## 2025-05-19 DIAGNOSIS — M12.811 ROTATOR CUFF ARTHROPATHY OF BOTH SHOULDERS: ICD-10-CM

## 2025-05-19 DIAGNOSIS — E78.2 MIXED HYPERLIPIDEMIA: ICD-10-CM

## 2025-05-19 DIAGNOSIS — M12.812 ROTATOR CUFF ARTHROPATHY OF BOTH SHOULDERS: ICD-10-CM

## 2025-05-19 DIAGNOSIS — G30.0 MILD EARLY ONSET ALZHEIMER'S DEMENTIA WITHOUT BEHAVIORAL DISTURBANCE, PSYCHOTIC DISTURBANCE, MOOD DISTURBANCE, OR ANXIETY (HCC): ICD-10-CM

## 2025-05-19 DIAGNOSIS — F34.1 DYSTHYMIC DISORDER: ICD-10-CM

## 2025-05-19 DIAGNOSIS — F02.A0 MILD EARLY ONSET ALZHEIMER'S DEMENTIA WITHOUT BEHAVIORAL DISTURBANCE, PSYCHOTIC DISTURBANCE, MOOD DISTURBANCE, OR ANXIETY (HCC): ICD-10-CM

## 2025-05-19 DIAGNOSIS — I10 BENIGN HYPERTENSION: ICD-10-CM

## 2025-05-19 DIAGNOSIS — R10.84 GENERALIZED ABDOMINAL PAIN: ICD-10-CM

## 2025-05-19 DIAGNOSIS — E03.9 ACQUIRED HYPOTHYROIDISM: ICD-10-CM

## 2025-05-19 RX ORDER — IRBESARTAN 150 MG/1
150 TABLET ORAL
Qty: 90 TABLET | Refills: 1 | Status: SHIPPED | OUTPATIENT
Start: 2025-05-19

## 2025-05-19 RX ORDER — ATORVASTATIN CALCIUM 20 MG/1
20 TABLET, FILM COATED ORAL DAILY
Qty: 90 TABLET | Refills: 1 | Status: SHIPPED | OUTPATIENT
Start: 2025-05-19

## 2025-05-19 RX ORDER — MELOXICAM 7.5 MG/1
7.5 TABLET ORAL DAILY
Qty: 90 TABLET | Refills: 1 | Status: SHIPPED | OUTPATIENT
Start: 2025-05-19

## 2025-05-19 RX ORDER — DONEPEZIL HYDROCHLORIDE 10 MG/1
10 TABLET, FILM COATED ORAL
Qty: 90 TABLET | Refills: 1 | Status: SHIPPED | OUTPATIENT
Start: 2025-05-19

## 2025-05-19 RX ORDER — MIRTAZAPINE 15 MG/1
15 TABLET, FILM COATED ORAL
Qty: 90 TABLET | Refills: 1 | Status: SHIPPED | OUTPATIENT
Start: 2025-05-19

## 2025-05-19 RX ORDER — DICYCLOMINE HCL 20 MG
20 TABLET ORAL 2 TIMES DAILY PRN
Qty: 20 TABLET | Refills: 1 | Status: SHIPPED | OUTPATIENT
Start: 2025-05-19

## 2025-05-19 RX ORDER — LEVOTHYROXINE SODIUM 112 UG/1
TABLET ORAL
Qty: 90 TABLET | Refills: 1 | Status: SHIPPED | OUTPATIENT
Start: 2025-05-19

## 2025-05-19 NOTE — TELEPHONE ENCOUNTER
PT MISPLACED ALL OF HER MEDICATION LAST FRIDAY AND HAS NOT TAKEN ANY MEDICATION SINCE           Medication:  atorvastatin (LIPITOR) 20 mg tablet    Dose/Frequency:   TAKE 1 TABLET EVERY DAY        Quantity: 90    Pharmacy: [facility]/pharmacy [address] [place] [address]     Office:   [x] PCP/Provider -   [] Speciality/Provider -     Does the patient have enough for 3 days?   [] Yes   [x] No - Send as HP to POD    Medication: dicyclomine (BENTYL) 20 mg tablet    Dose/Frequency:   Take 1 tablet (20 mg total) by mouth 2 (two) times a day as needed (abd pain)        Quantity: 20    Pharmacy: [facility]/pharmacy [address] [place] [address]     Office:   [x] PCP/Provider -   [] Speciality/Provider -     Does the patient have enough for 3 days?   [] Yes   [x] No - Send as HP to PO      Medication: donepezil (ARICEPT) 10 mg tablet       Dose/Frequency: TAKE 1 TABLET AT BEDTIME      Quantity: 90    Pharmacy: [facility]/pharmacy [address] [place] [address]     Office:   [x] PCP/Provider -   [] Speciality/Provider -     Does the patient have enough for 3 days?   [] Yes   [x] No - Send as HP to POD      Medication:  irbesartan (AVAPRO) 150 mg tablet    Dose/Frequency:   TAKE 1 TABLET AT BEDTIME        Quantity: 90    Pharmacy: [facility]/pharmacy [address] [place] [address]     Office:   [x] PCP/Provider -   [] Speciality/Provider -     Does the patient have enough for 3 days?   [] Yes   [x] No - Send as HP to POD        Medication: levothyroxine 112 mcg tablet     Dose/Frequency: TAKE 1 TABLET EVERY DAY IN THE EARLY MORNING      Quantity: 90    Pharmacy: [facility]/pharmacy [address] [place] [address]     Office:   [x] PCP/Provider -   [] Speciality/Provider -     Does the patient have enough for 3 days?   [] Yes   [x] No - Send as HP to POD      Medication:  meloxicam (MOBIC) 7.5 mg tablet    Dose/Frequency: TAKE 1 TABLET EVERY DAY      Quantity: 90    Pharmacy: [facility]/pharmacy #[facility]  RADHA CLIFTON [address] [place] [address]     Office:   [x] PCP/Provider -   [] Speciality/Provider -     Does the patient have enough for 3 days?   [] Yes   [x] No - Send as HP to POD    Medication:  mirtazapine (REMERON) 15 mg tablet    Dose/Frequency:   TAKE 1 TABLET AT BEDTIME        Quantity: 90    Pharmacy: [facility]/pharmacy #[id number] - [place], [place] [address]     Office:   [x] PCP/Provider -   [] Speciality/Provider -     Does the patient have enough for 3 days?   [] Yes   [x] No - Send as HP to POD

## 2025-05-20 NOTE — TELEPHONE ENCOUNTER
Pharmacy refused for the refill. Called pharmacy and they stated that  office needs to call insurance and ask them to over ride due to patient losing her pills. Only then they can refill.  Mami do the needful and notify patient's daughter in law accordingly.  Please get Dr. Florian patient is without medication I noticed that he is on rounds for all day today.  Thank you!!

## 2025-06-16 ENCOUNTER — APPOINTMENT (OUTPATIENT)
Dept: LAB | Facility: CLINIC | Age: 80
End: 2025-06-16
Attending: INTERNAL MEDICINE
Payer: COMMERCIAL

## 2025-06-16 DIAGNOSIS — E53.8 B12 DEFICIENCY: ICD-10-CM

## 2025-06-16 DIAGNOSIS — R73.01 IMPAIRED FASTING BLOOD SUGAR: ICD-10-CM

## 2025-06-16 DIAGNOSIS — E78.2 MIXED HYPERLIPIDEMIA: ICD-10-CM

## 2025-06-16 DIAGNOSIS — I10 BENIGN HYPERTENSION: ICD-10-CM

## 2025-06-16 LAB
ALBUMIN SERPL BCG-MCNC: 3.9 G/DL (ref 3.5–5)
ALP SERPL-CCNC: 75 U/L (ref 34–104)
ALT SERPL W P-5'-P-CCNC: 13 U/L (ref 7–52)
ANION GAP SERPL CALCULATED.3IONS-SCNC: 7 MMOL/L (ref 4–13)
AST SERPL W P-5'-P-CCNC: 21 U/L (ref 13–39)
BASOPHILS # BLD AUTO: 0.05 THOUSANDS/ÂΜL (ref 0–0.1)
BASOPHILS NFR BLD AUTO: 1 % (ref 0–1)
BILIRUB SERPL-MCNC: 0.41 MG/DL (ref 0.2–1)
BUN SERPL-MCNC: 25 MG/DL (ref 5–25)
CALCIUM SERPL-MCNC: 8.6 MG/DL (ref 8.4–10.2)
CHLORIDE SERPL-SCNC: 106 MMOL/L (ref 96–108)
CHOLEST SERPL-MCNC: 313 MG/DL (ref ?–200)
CO2 SERPL-SCNC: 29 MMOL/L (ref 21–32)
CREAT SERPL-MCNC: 1.2 MG/DL (ref 0.6–1.3)
EOSINOPHIL # BLD AUTO: 0.29 THOUSAND/ÂΜL (ref 0–0.61)
EOSINOPHIL NFR BLD AUTO: 5 % (ref 0–6)
ERYTHROCYTE [DISTWIDTH] IN BLOOD BY AUTOMATED COUNT: 13.6 % (ref 11.6–15.1)
EST. AVERAGE GLUCOSE BLD GHB EST-MCNC: 111 MG/DL
GFR SERPL CREATININE-BSD FRML MDRD: 43 ML/MIN/1.73SQ M
GLUCOSE P FAST SERPL-MCNC: 84 MG/DL (ref 65–99)
HBA1C MFR BLD: 5.5 %
HCT VFR BLD AUTO: 42.3 % (ref 34.8–46.1)
HDLC SERPL-MCNC: 67 MG/DL
HGB BLD-MCNC: 13.5 G/DL (ref 11.5–15.4)
IMM GRANULOCYTES # BLD AUTO: 0.06 THOUSAND/UL (ref 0–0.2)
IMM GRANULOCYTES NFR BLD AUTO: 1 % (ref 0–2)
LDLC SERPL CALC-MCNC: 216 MG/DL (ref 0–100)
LYMPHOCYTES # BLD AUTO: 1.98 THOUSANDS/ÂΜL (ref 0.6–4.47)
LYMPHOCYTES NFR BLD AUTO: 34 % (ref 14–44)
MCH RBC QN AUTO: 28.9 PG (ref 26.8–34.3)
MCHC RBC AUTO-ENTMCNC: 31.9 G/DL (ref 31.4–37.4)
MCV RBC AUTO: 91 FL (ref 82–98)
MONOCYTES # BLD AUTO: 0.4 THOUSAND/ÂΜL (ref 0.17–1.22)
MONOCYTES NFR BLD AUTO: 7 % (ref 4–12)
NEUTROPHILS # BLD AUTO: 3.1 THOUSANDS/ÂΜL (ref 1.85–7.62)
NEUTS SEG NFR BLD AUTO: 52 % (ref 43–75)
NRBC BLD AUTO-RTO: 0 /100 WBCS
PLATELET # BLD AUTO: 204 THOUSANDS/UL (ref 149–390)
PMV BLD AUTO: 12.3 FL (ref 8.9–12.7)
POTASSIUM SERPL-SCNC: 4.4 MMOL/L (ref 3.5–5.3)
PROT SERPL-MCNC: 6.4 G/DL (ref 6.4–8.4)
RBC # BLD AUTO: 4.67 MILLION/UL (ref 3.81–5.12)
SODIUM SERPL-SCNC: 142 MMOL/L (ref 135–147)
TRIGL SERPL-MCNC: 148 MG/DL (ref ?–150)
TSH SERPL DL<=0.05 MIU/L-ACNC: 136.07 UIU/ML (ref 0.45–4.5)
VIT B12 SERPL-MCNC: 215 PG/ML (ref 180–914)
WBC # BLD AUTO: 5.88 THOUSAND/UL (ref 4.31–10.16)

## 2025-06-16 PROCEDURE — 83036 HEMOGLOBIN GLYCOSYLATED A1C: CPT

## 2025-06-16 PROCEDURE — 80053 COMPREHEN METABOLIC PANEL: CPT

## 2025-06-16 PROCEDURE — 82607 VITAMIN B-12: CPT

## 2025-06-16 PROCEDURE — 36415 COLL VENOUS BLD VENIPUNCTURE: CPT

## 2025-06-16 PROCEDURE — 80061 LIPID PANEL: CPT

## 2025-06-16 PROCEDURE — 85025 COMPLETE CBC W/AUTO DIFF WBC: CPT

## 2025-06-16 PROCEDURE — 84443 ASSAY THYROID STIM HORMONE: CPT

## 2025-06-19 ENCOUNTER — OFFICE VISIT (OUTPATIENT)
Dept: INTERNAL MEDICINE CLINIC | Facility: CLINIC | Age: 80
End: 2025-06-19
Payer: COMMERCIAL

## 2025-06-19 VITALS
HEART RATE: 55 BPM | SYSTOLIC BLOOD PRESSURE: 162 MMHG | HEIGHT: 59 IN | DIASTOLIC BLOOD PRESSURE: 92 MMHG | TEMPERATURE: 99.1 F | BODY MASS INDEX: 27.01 KG/M2 | OXYGEN SATURATION: 98 % | WEIGHT: 134 LBS

## 2025-06-19 DIAGNOSIS — F02.A0 MILD EARLY ONSET ALZHEIMER'S DEMENTIA WITHOUT BEHAVIORAL DISTURBANCE, PSYCHOTIC DISTURBANCE, MOOD DISTURBANCE, OR ANXIETY (HCC): ICD-10-CM

## 2025-06-19 DIAGNOSIS — I10 BENIGN HYPERTENSION: ICD-10-CM

## 2025-06-19 DIAGNOSIS — M12.812 ROTATOR CUFF ARTHROPATHY OF BOTH SHOULDERS: ICD-10-CM

## 2025-06-19 DIAGNOSIS — F34.1 DYSTHYMIC DISORDER: ICD-10-CM

## 2025-06-19 DIAGNOSIS — M12.811 ROTATOR CUFF ARTHROPATHY OF BOTH SHOULDERS: ICD-10-CM

## 2025-06-19 DIAGNOSIS — E53.8 B12 DEFICIENCY: Primary | ICD-10-CM

## 2025-06-19 DIAGNOSIS — G30.0 MILD EARLY ONSET ALZHEIMER'S DEMENTIA WITHOUT BEHAVIORAL DISTURBANCE, PSYCHOTIC DISTURBANCE, MOOD DISTURBANCE, OR ANXIETY (HCC): ICD-10-CM

## 2025-06-19 DIAGNOSIS — E03.9 ACQUIRED HYPOTHYROIDISM: ICD-10-CM

## 2025-06-19 DIAGNOSIS — E78.2 MIXED HYPERLIPIDEMIA: ICD-10-CM

## 2025-06-19 PROCEDURE — G2211 COMPLEX E/M VISIT ADD ON: HCPCS | Performed by: INTERNAL MEDICINE

## 2025-06-19 PROCEDURE — 99214 OFFICE O/P EST MOD 30 MIN: CPT | Performed by: INTERNAL MEDICINE

## 2025-06-19 PROCEDURE — 96372 THER/PROPH/DIAG INJ SC/IM: CPT | Performed by: INTERNAL MEDICINE

## 2025-06-19 RX ORDER — CYANOCOBALAMIN 1000 UG/ML
1000 INJECTION, SOLUTION INTRAMUSCULAR; SUBCUTANEOUS ONCE
Status: COMPLETED | OUTPATIENT
Start: 2025-06-19 | End: 2025-06-19

## 2025-06-19 RX ORDER — ATORVASTATIN CALCIUM 20 MG/1
20 TABLET, FILM COATED ORAL DAILY
Qty: 90 TABLET | Refills: 1 | Status: SHIPPED | OUTPATIENT
Start: 2025-06-19

## 2025-06-19 RX ORDER — MELOXICAM 7.5 MG/1
7.5 TABLET ORAL DAILY
Qty: 90 TABLET | Refills: 1 | Status: SHIPPED | OUTPATIENT
Start: 2025-06-19

## 2025-06-19 RX ORDER — MAGNESIUM 200 MG
1000 TABLET ORAL DAILY
Qty: 100 TABLET | Refills: 1 | Status: SHIPPED | OUTPATIENT
Start: 2025-06-19

## 2025-06-19 RX ORDER — MIRTAZAPINE 15 MG/1
15 TABLET, FILM COATED ORAL
Qty: 90 TABLET | Refills: 1 | Status: SHIPPED | OUTPATIENT
Start: 2025-06-19

## 2025-06-19 RX ORDER — LEVOTHYROXINE SODIUM 112 UG/1
TABLET ORAL
Qty: 90 TABLET | Refills: 1 | Status: SHIPPED | OUTPATIENT
Start: 2025-06-19

## 2025-06-19 RX ORDER — IRBESARTAN 150 MG/1
150 TABLET ORAL
Qty: 90 TABLET | Refills: 1 | Status: SHIPPED | OUTPATIENT
Start: 2025-06-19

## 2025-06-19 RX ORDER — DONEPEZIL HYDROCHLORIDE 10 MG/1
10 TABLET, FILM COATED ORAL
Qty: 90 TABLET | Refills: 1 | Status: SHIPPED | OUTPATIENT
Start: 2025-06-19

## 2025-06-19 RX ADMIN — CYANOCOBALAMIN 1000 MCG: 1000 INJECTION, SOLUTION INTRAMUSCULAR; SUBCUTANEOUS at 17:20

## 2025-06-19 NOTE — ASSESSMENT & PLAN NOTE
Restart medication  Orders:  •  irbesartan (AVAPRO) 150 mg tablet; Take 1 tablet (150 mg total) by mouth daily at bedtime

## 2025-06-19 NOTE — PROGRESS NOTES
Name: Jacklyn Angeles      : 1945      MRN: [id number]  Encounter Provider: Ez Ruiz MD  Encounter Date: 2025   Encounter department: [facility] [address]  :  Assessment & Plan  Benign hypertension  Restart medication  Orders:  •  irbesartan (AVAPRO) 150 mg tablet; Take 1 tablet (150 mg total) by mouth daily at bedtime    Acquired hypothyroidism  Restart medication  Orders:  •  levothyroxine 112 mcg tablet; TAKE 1 TABLET EVERY DAY IN THE EARLY MORNING Strength: 112 mcg  •  TSH, 3rd generation; Future    Rotator cuff arthropathy of both shoulders    Orders:  •  meloxicam (MOBIC) 7.5 mg tablet; Take 1 tablet (7.5 mg total) by mouth daily    Mixed hyperlipidemia  Restart medication  Orders:  •  atorvastatin (LIPITOR) 20 mg tablet; Take 1 tablet (20 mg total) by mouth daily    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, psychotic disturbance, mood disturbance, or anxiety (HCC)  Restart medication  Orders:  •  donepezil (ARICEPT) 10 mg tablet; Take 1 tablet (10 mg total) by mouth daily at bedtime    Dysthymic disorder  Restart medication  Orders:  •  mirtazapine (REMERON) 15 mg tablet; Take 1 tablet (15 mg total) by mouth daily at bedtime    B12 deficiency    Orders:  •  Cyanocobalamin (B-12) 1000 MCG SUBL; Place 1 tablet (1,000 mcg total) under the tongue in the morning  •  Vitamin B12; Future           History of Present Illness   Follow-up on blood test done on 2025 test discussed with her      Review of Systems   Constitutional:  Negative for chills and fever.   HENT:  Negative for congestion, ear pain and sore throat.    Eyes:  Negative for pain.   Respiratory:  Negative for cough and shortness of breath.    Cardiovascular:  Negative for chest pain and leg swelling.   Gastrointestinal:  Negative for abdominal pain, nausea and vomiting.   Endocrine: Negative for polyuria.   Genitourinary:  Negative for difficulty urinating, frequency and  "urgency.   Musculoskeletal:  Positive for arthralgias. Negative for back pain.   Skin:  Negative for rash.   Neurological:  Negative for weakness and headaches.   Psychiatric/Behavioral:  Negative for sleep disturbance. The patient is not nervous/anxious.        Objective   /92 (BP Location: Right arm, Patient Position: Sitting, Cuff Size: Standard)   Pulse 55   Temp 99.1 °F (37.3 °C) (Temporal)   Ht 4' 11\" (1.499 m)   Wt 60.8 kg (134 lb)   SpO2 98%   BMI 27.06 kg/m²      Physical Exam  Vitals and nursing note reviewed.   Constitutional:       General: She is not in acute distress.     Appearance: She is well-developed.   HENT:      Head: Normocephalic and atraumatic.      Right Ear: External ear normal.      Left Ear: External ear normal.      Mouth/Throat:      Mouth: Mucous membranes are moist.      Pharynx: Oropharynx is clear.     Eyes:      Extraocular Movements: Extraocular movements intact.      Conjunctiva/sclera: Conjunctivae normal.       Cardiovascular:      Rate and Rhythm: Normal rate and regular rhythm.      Heart sounds: Normal heart sounds. No murmur heard.  Pulmonary:      Effort: Pulmonary effort is normal. No respiratory distress.      Breath sounds: Normal breath sounds. No wheezing or rales.   Abdominal:      General: Abdomen is flat. There is no distension.      Palpations: Abdomen is soft.      Tenderness: There is no abdominal tenderness.     Musculoskeletal:         General: No swelling.      Cervical back: Neck supple.      Right lower leg: No edema.      Left lower leg: No edema.     Skin:     General: Skin is warm and dry.      Capillary Refill: Capillary refill takes less than 2 seconds.     Neurological:      General: No focal deficit present.      Mental Status: She is alert and oriented to person, place, and time.     Psychiatric:         Mood and Affect: Mood normal.         "

## 2025-06-19 NOTE — ASSESSMENT & PLAN NOTE
Restart medication  Orders:  •  mirtazapine (REMERON) 15 mg tablet; Take 1 tablet (15 mg total) by mouth daily at bedtime

## 2025-06-19 NOTE — ASSESSMENT & PLAN NOTE
Restart medication  Orders:  •  levothyroxine 112 mcg tablet; TAKE 1 TABLET EVERY DAY IN THE EARLY MORNING Strength: 112 mcg  •  TSH, 3rd generation; Future

## 2025-06-19 NOTE — ASSESSMENT & PLAN NOTE
Restart medication  Orders:  •  donepezil (ARICEPT) 10 mg tablet; Take 1 tablet (10 mg total) by mouth daily at bedtime

## 2025-06-19 NOTE — ASSESSMENT & PLAN NOTE
Restart medication  Orders:  •  atorvastatin (LIPITOR) 20 mg tablet; Take 1 tablet (20 mg total) by mouth daily

## 2025-07-03 ENCOUNTER — NURSE TRIAGE (OUTPATIENT)
Age: 80
End: 2025-07-03

## 2025-07-03 NOTE — TELEPHONE ENCOUNTER
"REASON FOR CONVERSATION: Breast Pain (Transfer from coworker, 2 identifiers verified)    SYMPTOMS: Patient's daughter in law called, patient having severe 7-10/10 L breast pain for 1.5 days. Does have history of breast cancer. Denies any other symptoms. Advised ED visit, daughter in law verbalized understanding and agreeable to treatment plan.     OTHER HEALTH INFORMATION: N/A    PROTOCOL DISPOSITION: Go to ED/U[facility] Now (Or to Office with PCP Approval)    CARE ADVICE PROVIDED: Go to ED Now    PRACTICE FOLLOW-UP: N/A    Reason for Disposition   Patient sounds very sick or weak to the triager    Answer Assessment - Initial Assessment Questions  1. SYMPTOM: \"What's the main symptom you're concerned about?\"  (e.g., lump, nipple discharge, pain, rash )      Pain  2. LOCATION: \"Where is the pain located?\"      L breast  3. ONSET: \"When did pain start?\"      1.5 days ago, 7/10  4. PRIOR HISTORY: \"Do you have any history of prior problems with your breasts?\" (e.g., breast cancer, breast implant, fibrocystic breast disease)      Breast cancer  5. CAUSE: \"What do you think is causing this symptom?\"      Unsure  6. OTHER SYMPTOMS: \"Do you have any other symptoms?\" (e.g., fever, breast pain, nipple discharge, redness or rash)      Denies   7. PREGNANCY-BREASTFEEDING: \"Is there any chance you are pregnant?\" \"When was your last menstrual period?\" \"Are you breastfeeding?\"          Protocols used: Breast Symptoms-Adult-[place]    "

## 2025-07-10 DIAGNOSIS — F34.1 DYSTHYMIC DISORDER: ICD-10-CM

## 2025-07-10 DIAGNOSIS — I10 BENIGN HYPERTENSION: ICD-10-CM

## 2025-07-10 DIAGNOSIS — R10.84 GENERALIZED ABDOMINAL PAIN: ICD-10-CM

## 2025-07-10 DIAGNOSIS — M12.812 ROTATOR CUFF ARTHROPATHY OF BOTH SHOULDERS: ICD-10-CM

## 2025-07-10 DIAGNOSIS — M12.811 ROTATOR CUFF ARTHROPATHY OF BOTH SHOULDERS: ICD-10-CM

## 2025-07-10 DIAGNOSIS — F02.A0 MILD EARLY ONSET ALZHEIMER'S DEMENTIA WITHOUT BEHAVIORAL DISTURBANCE, PSYCHOTIC DISTURBANCE, MOOD DISTURBANCE, OR ANXIETY (HCC): ICD-10-CM

## 2025-07-10 DIAGNOSIS — G30.0 MILD EARLY ONSET ALZHEIMER'S DEMENTIA WITHOUT BEHAVIORAL DISTURBANCE, PSYCHOTIC DISTURBANCE, MOOD DISTURBANCE, OR ANXIETY (HCC): ICD-10-CM

## 2025-07-10 DIAGNOSIS — E78.2 MIXED HYPERLIPIDEMIA: ICD-10-CM

## 2025-07-11 RX ORDER — MIRTAZAPINE 15 MG/1
15 TABLET, FILM COATED ORAL
Qty: 90 TABLET | Refills: 1 | Status: SHIPPED | OUTPATIENT
Start: 2025-07-11

## 2025-07-11 RX ORDER — DONEPEZIL HYDROCHLORIDE 10 MG/1
10 TABLET, FILM COATED ORAL
Qty: 90 TABLET | Refills: 1 | Status: SHIPPED | OUTPATIENT
Start: 2025-07-11

## 2025-07-11 RX ORDER — IRBESARTAN 150 MG/1
150 TABLET ORAL
Qty: 90 TABLET | Refills: 1 | Status: SHIPPED | OUTPATIENT
Start: 2025-07-11

## 2025-07-11 RX ORDER — ATORVASTATIN CALCIUM 20 MG/1
20 TABLET, FILM COATED ORAL DAILY
Qty: 90 TABLET | Refills: 1 | Status: SHIPPED | OUTPATIENT
Start: 2025-07-11

## 2025-07-11 RX ORDER — MELOXICAM 7.5 MG/1
7.5 TABLET ORAL DAILY
Qty: 90 TABLET | Refills: 1 | Status: SHIPPED | OUTPATIENT
Start: 2025-07-11

## 2025-07-14 RX ORDER — DICYCLOMINE HCL 20 MG
20 TABLET ORAL 2 TIMES DAILY PRN
Qty: 20 TABLET | Refills: 0 | Status: SHIPPED | OUTPATIENT
Start: 2025-07-14

## 2025-07-16 ENCOUNTER — HOSPITAL ENCOUNTER (OUTPATIENT)
Dept: RADIOLOGY | Age: 80
Discharge: HOME | End: 2025-07-16
Attending: INTERNAL MEDICINE
Payer: COMMERCIAL

## 2025-07-16 VITALS — HEIGHT: 59 IN | WEIGHT: 134 LBS | BODY MASS INDEX: 27.01 KG/M2

## 2025-07-16 DIAGNOSIS — Z12.31 ENCOUNTER FOR SCREENING MAMMOGRAM FOR MALIGNANT NEOPLASM OF BREAST: ICD-10-CM

## 2025-07-16 PROCEDURE — 77063 BREAST TOMOSYNTHESIS BI: CPT

## 2025-07-16 PROCEDURE — 77067 SCR MAMMO BI INCL CAD: CPT

## 2025-07-24 ENCOUNTER — HOSPITAL ENCOUNTER (EMERGENCY)
Facility: HOSPITAL | Age: 80
Discharge: HOME | End: 2025-07-24
Attending: EMERGENCY MEDICINE
Payer: COMMERCIAL

## 2025-07-24 ENCOUNTER — APPOINTMENT (EMERGENCY)
Dept: RADIOLOGY | Facility: HOSPITAL | Age: 80
End: 2025-07-24
Payer: COMMERCIAL

## 2025-07-24 VITALS
TEMPERATURE: 98.3 F | HEART RATE: 58 BPM | RESPIRATION RATE: 16 BRPM | SYSTOLIC BLOOD PRESSURE: 215 MMHG | DIASTOLIC BLOOD PRESSURE: 85 MMHG | OXYGEN SATURATION: 98 %

## 2025-07-24 DIAGNOSIS — R41.3 MEMORY LOSS: Primary | ICD-10-CM

## 2025-07-24 DIAGNOSIS — N64.4 BREAST PAIN, LEFT: ICD-10-CM

## 2025-07-24 LAB
ANION GAP SERPL CALCULATED.3IONS-SCNC: 5 MMOL/L (ref 4–13)
APTT PPP: 26 SECONDS (ref 23–34)
BASOPHILS # BLD AUTO: 0.04 THOUSANDS/ÂΜL (ref 0–0.1)
BASOPHILS NFR BLD AUTO: 1 % (ref 0–1)
BUN SERPL-MCNC: 32 MG/DL (ref 5–25)
CALCIUM SERPL-MCNC: 9.7 MG/DL (ref 8.4–10.2)
CARDIAC TROPONIN I PNL SERPL HS: 6 NG/L (ref ?–50)
CHLORIDE SERPL-SCNC: 107 MMOL/L (ref 96–108)
CO2 SERPL-SCNC: 29 MMOL/L (ref 21–32)
CREAT SERPL-MCNC: 1.35 MG/DL (ref 0.6–1.3)
D DIMER PPP FEU-MCNC: 0.52 UG/ML FEU
EOSINOPHIL # BLD AUTO: 0.22 THOUSAND/ÂΜL (ref 0–0.61)
EOSINOPHIL NFR BLD AUTO: 3 % (ref 0–6)
ERYTHROCYTE [DISTWIDTH] IN BLOOD BY AUTOMATED COUNT: 14.8 % (ref 11.6–15.1)
GFR SERPL CREATININE-BSD FRML MDRD: 37 ML/MIN/1.73SQ M
GLUCOSE SERPL-MCNC: 93 MG/DL (ref 65–140)
HCT VFR BLD AUTO: 43.4 % (ref 34.8–46.1)
HGB BLD-MCNC: 13.8 G/DL (ref 11.5–15.4)
IMM GRANULOCYTES # BLD AUTO: 0.09 THOUSAND/UL (ref 0–0.2)
IMM GRANULOCYTES NFR BLD AUTO: 1 % (ref 0–2)
INR PPP: 0.94 (ref 0.85–1.19)
LYMPHOCYTES # BLD AUTO: 1.84 THOUSANDS/ÂΜL (ref 0.6–4.47)
LYMPHOCYTES NFR BLD AUTO: 22 % (ref 14–44)
MCH RBC QN AUTO: 29.2 PG (ref 26.8–34.3)
MCHC RBC AUTO-ENTMCNC: 31.8 G/DL (ref 31.4–37.4)
MCV RBC AUTO: 92 FL (ref 82–98)
MONOCYTES # BLD AUTO: 0.39 THOUSAND/ÂΜL (ref 0.17–1.22)
MONOCYTES NFR BLD AUTO: 5 % (ref 4–12)
NEUTROPHILS # BLD AUTO: 5.77 THOUSANDS/ÂΜL (ref 1.85–7.62)
NEUTS SEG NFR BLD AUTO: 68 % (ref 43–75)
NRBC BLD AUTO-RTO: 0 /100 WBCS
PLATELET # BLD AUTO: 214 THOUSANDS/UL (ref 149–390)
PMV BLD AUTO: 12.4 FL (ref 8.9–12.7)
POTASSIUM SERPL-SCNC: 4.5 MMOL/L (ref 3.5–5.3)
PROTHROMBIN TIME: 12.9 SECONDS (ref 12.3–15)
RBC # BLD AUTO: 4.72 MILLION/UL (ref 3.81–5.12)
SODIUM SERPL-SCNC: 141 MMOL/L (ref 135–147)
WBC # BLD AUTO: 8.35 THOUSAND/UL (ref 4.31–10.16)

## 2025-07-24 PROCEDURE — 99283 EMERGENCY DEPT VISIT LOW MDM: CPT

## 2025-07-24 PROCEDURE — 85610 PROTHROMBIN TIME: CPT | Performed by: EMERGENCY MEDICINE

## 2025-07-24 PROCEDURE — 80048 BASIC METABOLIC PNL TOTAL CA: CPT | Performed by: EMERGENCY MEDICINE

## 2025-07-24 PROCEDURE — 99284 EMERGENCY DEPT VISIT MOD MDM: CPT | Performed by: EMERGENCY MEDICINE

## 2025-07-24 PROCEDURE — 93005 ELECTROCARDIOGRAM TRACING: CPT

## 2025-07-24 PROCEDURE — 85025 COMPLETE CBC W/AUTO DIFF WBC: CPT | Performed by: EMERGENCY MEDICINE

## 2025-07-24 PROCEDURE — 71046 X-RAY EXAM CHEST 2 VIEWS: CPT

## 2025-07-24 PROCEDURE — 84484 ASSAY OF TROPONIN QUANT: CPT

## 2025-07-24 PROCEDURE — 36415 COLL VENOUS BLD VENIPUNCTURE: CPT | Performed by: EMERGENCY MEDICINE

## 2025-07-24 PROCEDURE — 85730 THROMBOPLASTIN TIME PARTIAL: CPT | Performed by: EMERGENCY MEDICINE

## 2025-07-24 PROCEDURE — 85379 FIBRIN DEGRADATION QUANT: CPT

## 2025-07-24 NOTE — ED NOTES
Pt patient's family, patient had lost her medications.  Medications were just refilled and she has only been taking them a for about one week.      Brooke M Markle, RN  07/24/25 [id number]

## 2025-07-24 NOTE — ED ATTENDING ATTESTATION
7/24/2025  I, Saúl Cornell MD, saw and evaluated the patient. I have discussed the patient with the resident/non-physician practitioner and agree with the resident's/non-physician practitioner's findings, Plan of Care, and MDM as documented in the resident's/non-physician practitioner's note, except where noted. All available labs and Radiology studies were reviewed.  I was present for key portions of any procedure(s) performed by the resident/non-physician practitioner and I was immediately available to provide assistance.       At this point I agree with the current assessment done in the Emergency Department.  I have conducted an independent evaluation of this patient a history and physical is as follows:    ED Course     80-year-old female here for evaluation of intermittent episodes of left-sided breast and chest pain over the past few days.  She has a history of breast cancer that was treated when she used to live in [place].  As far as patient was aware she had been in remission for the past 10 years or so but had a recent mammogram that showed masses in both breasts.  She describes pain that is intermittent but sharp and severe at times.  She does have some dementia and has difficulty telling me exactly when this seems to occur but family members who are at bedside reports that she called them complaining of pain today around 11 AM or noon.  Patient and family deny history of VTE, they do note that has a fairly sedentary lifestyle.  No fevers.    On exam GCS is 15 nonfocal, sclera nonicteric conjunctive normal, mucous membranes are mildly dry, regular rate and rhythm, clear to auscultation bilaterally, abdomen is soft and nondistended, there is no extremity edema, deformity, or tenderness.  Palpable peripheral pulses all 4 extremities.    I/P: 80-year-old female with history of breast cancer, likely with recurrence based on recent mammogram.  Somewhat of a poor historian due to dementia but is describing  intermittent chest pain over the past several days.  Will check cardiac workup and D-dimer to rule out PE given history.  Patient currently asymptomatic but will treat symptoms if they occur.  Reassess prior to dispo.    Critical Care Time  Procedures

## 2025-07-25 LAB
ATRIAL RATE: 59 BPM
P AXIS: 46 DEGREES
PR INTERVAL: 138 MS
QRS AXIS: 24 DEGREES
QRSD INTERVAL: 86 MS
QT INTERVAL: 412 MS
QTC INTERVAL: 409 MS
T WAVE AXIS: 154 DEGREES
VENTRICULAR RATE: 59 BPM

## 2025-07-25 PROCEDURE — 93010 ELECTROCARDIOGRAM REPORT: CPT | Performed by: INTERNAL MEDICINE

## 2025-07-25 NOTE — DISCHARGE INSTRUCTIONS
Fuiste visto en la berenice de emergencias por dolor en el pecho. Tus análisis de laboratorio, imágenes y EKG fueron tranquilizadores. Es importante hacer un seguimiento con tu médico de atención primaria para discutir tu mamografía y el dolor en el pecho. Regresa a la berenice de emergencias si desarrollas dificultad para respirar, fiebre, vómitos, empeoramiento de los síntomas o cualquier síntoma nuevo o preocupante. Puedes willy Tylenol 650 mg cada 6 horas según sea necesario para el dolor.       You were seen in the ER for breast pain. Your lab work, imaging, EKG were reassuring. It is important to follow up with your primary care doctor to discuss your mammogram and chest pain. Return to the ER if you develop shortness of breath, fever, vomiting, worsening symptoms, or any new or concerning symptoms. You can take tylenol 650mg every 6 hours as needed for pain.

## 2025-07-31 NOTE — ED PROVIDER NOTES
"Time reflects when diagnosis was documented in both MDM as applicable and the Disposition within this note       Time User Action Codes Description Comment    7/24/2025  5:41 PM Markle, Brooke M Add [R41.3] Memory loss     7/24/2025  8:20 PM Anup Mindy Add [N64.4] Breast pain, left           ED Disposition       ED Disposition   Discharge    Condition   Stable    Date/Time   Thu Jul 24, 2025  8:06 PM    Comment   Jacklyn Angeles discharge to home/self care.                   Assessment & Plan       Medical Decision Making  Patient is a 80 y.o. female  who presents to the ED with breast pain.    differential diagnosis included but not limited to acs, arrhythmia, PE given likely recurrent cancer, breast mass pain, pneumothorax.     Plan: cbc, cmp, troponin, EKG, d dimer, chest xray    View ED course above for further discussion on patient workup.     On review of previous records reviewed mammogram concerning for recurrent breast cancer.    All labs reviewed and utilized in the medical decision making process  All radiology studies independently viewed by me and interpreted by the radiologist.  I reviewed all testing with the patient.     Upon re-evaluation patient is asymptomatic. Discussed lab work, EKG, chest xray were all reassuring. Patient needs further workup regarding her mammogram and already has scheduled appointment. Discussed return precautions and supportive care. Encouraged PCP followup. Patient/guardian agrees and understands plan. All questions answered.       Disposition: stable for discharge    Portions of the record may have been created with voice recognition software. Occasional wrong word or \"sound a like\" substitutions may have occurred due to the inherent limitations of voice recognition software. Read the chart carefully and recognize, using context, where substitutions have occurred.      Amount and/or Complexity of Data Reviewed  Labs: ordered. Decision-making details documented in ED " Course.  Radiology: ordered. Decision-making details documented in ED Course.        ED Course as of 07/31/25 1926   Thu Jul 24, 2025   1852 EKG inter by me, sinus bradycardia at a rate of 59, normal axis and intervals, mild ST depressions in lateral leads, old T wave version lead I, new aVL T wave inversion   1907 XR chest 2 views  IMPRESSION:     No acute cardiopulmonary disease.     1926 D-Dimer, Quant(!): 0.52  Using age adjusted, within normal limits   2035 Blood Pressure(!): 215/85  Patient remains symptom-free.  She was supposed to take her blood pressure medicine tonight.       Medications - No data to display    ED Risk Strat Scores                    No data recorded        SBIRT 20yo+      Flowsheet Row Most Recent Value   Initial Alcohol Screen: US AUDIT-C     1. How often do you have a drink containing alcohol? 0 Filed at: 07/24/2025 1613   2. How many drinks containing alcohol do you have on a typical day you are drinking?  0 Filed at: 07/24/2025 1613   3a. Male UNDER 65: How often do you have five or more drinks on one occasion? 0 Filed at: 07/24/2025 1613   3b. FEMALE Any Age, or MALE 65+: How often do you have 4 or more drinks on one occassion? 0 Filed at: 07/24/2025 1613   Audit-C Score 0 Filed at: 07/24/2025 1613   KARIE: How many times in the past year have you...    Used an illegal drug or used a prescription medication for non-medical reasons? Never Filed at: 07/24/2025 1613            Wells' Criteria for PE      Flowsheet Row Most Recent Value   Wells' Criteria for PE    Clinical signs and symptoms of DVT 0 Filed at: 07/24/2025 1805   PE is primary diagnosis or equally likely 0 Filed at: 07/24/2025 1805   HR >100 0 Filed at: 07/24/2025 1805   Immobilization at least 3 days or Surgery in the previous 4 weeks 0 Filed at: 07/24/2025 1805   Previous, objectively diagnosed PE or DVT 0 Filed at: 07/24/2025 1805   Hemoptysis 0 Filed at: 07/24/2025 1805   Malignancy with treatment within 6 months or  palliative 1 Filed at: 07/24/2025 180   Wells' Criteria Total 1 Filed at: 07/24/2025 1802                        History of Present Illness       Chief Complaint   Patient presents with    Breast Pain     Pt having left sided breast pain for 2 weeks. Today the pain is worse. No drianage noted. Pt did have some bruising on her breast last week       Past Medical History[1]   Past Surgical History[2]   Family History[3]   Social History[4]   E-Cigarette/Vaping    E-Cigarette Use Never User       E-Cigarette/Vaping Substances    Nicotine No     THC No     CBD No     Flavoring No     Other No     Unknown No       I have reviewed and agree with the history as documented.     80F with PMH of breast cancer in remission, dementia presents for evalaution of left sided breast pain that has been occurring for a few weeks intermittently. Pain is controlled by tylenol. Patient states she had recent mammogram done which showed some type of finding in bilateral breasts that her PCP is pursuing further workup for. Denies shortness of breath, vomiting, fever, cough.           Review of Systems   Constitutional:  Negative for chills and fever.   HENT:  Negative for ear pain and sore throat.    Eyes:  Negative for pain and visual disturbance.   Respiratory:  Negative for cough and shortness of breath.    Cardiovascular:  Positive for chest pain. Negative for palpitations.   Gastrointestinal:  Negative for abdominal pain and vomiting.   Genitourinary:  Negative for dysuria and hematuria.   Musculoskeletal:  Negative for arthralgias and back pain.   Skin:  Negative for color change and rash.   Neurological:  Negative for seizures and syncope.   All other systems reviewed and are negative.          Objective       ED Triage Vitals   Temperature Pulse Blood Pressure Respirations SpO2 Patient Position - Orthostatic VS   07/24/25 1611 07/24/25 1611 07/24/25 1614 07/24/25 1611 07/24/25 1611 07/24/25 2014   98.3 °F (36.8 °C) 66 (!) 220/100  18 98 % Sitting      Temp Source Heart Rate Source BP Location FiO2 (%) Pain Score    07/24/25 1611 -- 07/24/25 1614 -- --    Oral  Right arm        Vitals      Date and Time Temp Pulse SpO2 Resp BP Pain Score FACES Pain Rating User   07/24/25 2014 -- 58 98 % 16 215/85 -- -- CH   07/24/25 1923 -- 60 99 % 16 -- -- -- CH   07/24/25 1614 -- -- -- -- 220/100 -- -- BG   07/24/25 1611 98.3 °F (36.8 °C) 66 98 % 18 -- -- -- BG            Physical Exam  Vitals and nursing note reviewed.   Constitutional:       General: She is not in acute distress.     Appearance: She is well-developed.   HENT:      Head: Normocephalic and atraumatic.     Eyes:      Conjunctiva/sclera: Conjunctivae normal.       Cardiovascular:      Rate and Rhythm: Normal rate and regular rhythm.      Heart sounds: Normal heart sounds. No murmur heard.  Pulmonary:      Effort: Pulmonary effort is normal. No respiratory distress.      Breath sounds: Normal breath sounds. No stridor, decreased air movement or transmitted upper airway sounds.   Chest:      Comments: Normal overlying skin of bilateral breasts, no warmth, erythema or fluctuance, no palpable masses  Abdominal:      Palpations: Abdomen is soft.      Tenderness: There is no abdominal tenderness.     Musculoskeletal:         General: No swelling.      Cervical back: Neck supple.     Skin:     General: Skin is warm and dry.      Capillary Refill: Capillary refill takes less than 2 seconds.     Neurological:      Mental Status: She is alert.     Psychiatric:         Mood and Affect: Mood normal.         Results Reviewed       Procedure Component Value Units Date/Time    HS Troponin 0hr (reflex protocol) [[id number]]  (Normal) Collected: 07/24/25 1846    Lab Status: Final result Specimen: Blood from Arm, Right Updated: 07/24/25 1926     hs TnI 0hr 6 ng/L     D-dimer, quantitative [[id number]]  (Abnormal) Collected: 07/24/25 1846    Lab Status: Final result Specimen: Blood from Arm, Right Updated:  07/24/25 1915     D-Dimer, Quant 0.52 ug/ml FEU     Narrative:      In the evaluation for possible pulmonary embolism, in the appropriate (Well's Score of 4 or less) patient, the age adjusted d-dimer cutoff for this patient can be calculated as:    Age x 0.01 (in ug/mL) for Age-adjusted D-dimer exclusion threshold for a patient over 50 years.    Basic metabolic panel [[id number]]  (Abnormal) Collected: 07/24/25 1737    Lab Status: Final result Specimen: Blood from Arm, Left Updated: 07/24/25 1808     Sodium 141 mmol/L      Potassium 4.5 mmol/L      Chloride 107 mmol/L      CO2 29 mmol/L      ANION GAP 5 mmol/L      BUN 32 mg/dL      Creatinine 1.35 mg/dL      Glucose 93 mg/dL      Calcium 9.7 mg/dL      eGFR 37 ml/min/1.73sq m     Narrative:      National Kid[employer] guidelines for Chronic Kidney Disease (CKD):     Stage 1 with normal or high GFR (GFR > 90 mL/min/1.73 square meters)    Stage 2 Mild CKD (GFR = 60-89 mL/min/1.73 square meters)    Stage 3A Moderate CKD (GFR = 45-59 mL/min/1.73 square meters)    Stage 3B Moderate CKD (GFR = 30-44 mL/min/1.73 square meters)    Stage 4 Severe CKD (GFR = 15-29 mL/min/1.73 square meters)    Stage 5 End Stage CKD (GFR <15 mL/min/1.73 square meters)  Note: GFR calculation is accurate only with a steady state creatinine    Protime-INR [[id number]]  (Normal) Collected: 07/24/25 1737    Lab Status: Final result Specimen: Blood from Arm, Left Updated: 07/24/25 1803     Protime 12.9 seconds      INR 0.94    Narrative:      INR Therapeutic Range    Indication                                             INR Range      Atrial Fibrillation                                               2.0-3.0  Hypercoagulable State                                    2.0.2.3  Left Ventricular Asist Device                            2.0-3.0  Mechanical Heart Valve                                  -    Aortic(with afib, MI, embolism, HF, LA enlargement,    and/or coagulopathy)                                      2.0-3.0 (2.5-3.5)     Mitral                                                             2.5-3.5  Prosthetic/Bioprosthetic Heart Valve               2.0-3.0  Venous thromboembolism (VTE: VT, PE        2.0-3.0    APTT [[id number]]  (Normal) Collected: 07/24/25 1737    Lab Status: Final result Specimen: Blood from Arm, Left Updated: 07/24/25 1803     PTT 26 seconds     CBC and differential [[id number]] Collected: 07/24/25 1737    Lab Status: Final result Specimen: Blood from Arm, Left Updated: 07/24/25 1745     WBC 8.35 Thousand/uL      RBC 4.72 Million/uL      Hemoglobin 13.8 g/dL      Hematocrit 43.4 %      MCV 92 fL      MCH 29.2 pg      MCHC 31.8 g/dL      RDW 14.8 %      MPV 12.4 fL      Platelets 214 Thousands/uL      nRBC 0 /100 WBCs      Segmented % 68 %      Immature Grans % 1 %      Lymphocytes % 22 %      Monocytes % 5 %      Eosinophils Relative 3 %      Basophils Relative 1 %      Absolute Neutrophils 5.77 Thousands/µL      Absolute Immature Grans 0.09 Thousand/uL      Absolute Lymphocytes 1.84 Thousands/µL      Absolute Monocytes 0.39 Thousand/µL      Eosinophils Absolute 0.22 Thousand/µL      Basophils Absolute 0.04 Thousands/µL             XR chest 2 views   Final Interpretation by Mary Bonilla MD (07/24 1854)      No acute cardiopulmonary disease.            Workstation performed: [id number]             Procedures    ED Medication and Procedure Management   Prior to Admission Medications   Prescriptions Last Dose Informant Patient Reported? Taking?   Cyanocobalamin (B-12) 1000 MCG SUBL   No Yes   Sig: Place 1 tablet (1,000 mcg total) under the tongue in the morning   atorvastatin (LIPITOR) 20 mg tablet   No Yes   Sig: Take 1 tablet (20 mg total) by mouth daily   benzonatate (TESSALON PERLES) 100 mg capsule  Self, Family Member No No   Sig: Take 1 capsule (100 mg total) by mouth 3 (three) times a day as needed for cough   Patient not taking: Reported on 2/19/2024   dicyclomine  (BENTYL) 20 mg tablet   No Yes   Sig: Take 1 tablet (20 mg total) by mouth 2 (two) times a day as needed (abd pain)   donepezil (ARICEPT) 10 mg tablet   No Yes   Sig: Take 1 tablet (10 mg total) by mouth daily at bedtime   hydrocortisone 2.5 % cream   No Yes   Sig: Apply topically 2 (two) times a day   irbesartan (AVAPRO) 150 mg tablet   No Yes   Sig: Take 1 tablet (150 mg total) by mouth daily at bedtime   levothyroxine 112 mcg tablet   No Yes   Sig: TAKE 1 TABLET EVERY DAY IN THE EARLY MORNING Strength: 112 mcg   meloxicam (MOBIC) 7.5 mg tablet   No Yes   Sig: Take 1 tablet (7.5 mg total) by mouth daily   mirtazapine (REMERON) 15 mg tablet   No Yes   Sig: Take 1 tablet (15 mg total) by mouth daily at bedtime   mupirocin (BACTROBAN) 2 % ointment   No Yes   Sig: APPLY TOPICALLY 3 (THREE) TIMES A DAY   triamcinolone (KENALOG) 0.1 % cream   No Yes   Sig: APPLY TOPICALLY 2 (TWO) TIMES A DAY      Facility-Administered Medications: None     Discharge Medication List as of 7/24/2025  8:23 PM        CONTINUE these medications which have NOT CHANGED    Details   atorvastatin (LIPITOR) 20 mg tablet Take 1 tablet (20 mg total) by mouth daily, Starting Fri 7/11/2025, Normal      Cyanocobalamin (B-12) 1000 MCG SUBL Place 1 tablet (1,000 mcg total) under the tongue in the morning, Starting Thu 6/19/2025, Normal      dicyclomine (BENTYL) 20 mg tablet Take 1 tablet (20 mg total) by mouth 2 (two) times a day as needed (abd pain), Starting Mon 7/14/2025, Normal      donepezil (ARICEPT) 10 mg tablet Take 1 tablet (10 mg total) by mouth daily at bedtime, Starting Fri 7/11/2025, Normal      hydrocortisone 2.5 % cream Apply topically 2 (two) times a day, Starting Thu 3/13/2025, Normal      irbesartan (AVAPRO) 150 mg tablet Take 1 tablet (150 mg total) by mouth daily at bedtime, Starting Fri 7/11/2025, Normal      levothyroxine 112 mcg tablet TAKE 1 TABLET EVERY DAY IN THE EARLY MORNING Strength: 112 mcg, Normal      meloxicam (MOBIC)  7.5 mg tablet Take 1 tablet (7.5 mg total) by mouth daily, Starting Fri 7/11/2025, Normal      mirtazapine (REMERON) 15 mg tablet Take 1 tablet (15 mg total) by mouth daily at bedtime, Starting Fri 7/11/2025, Normal      mupirocin (BACTROBAN) 2 % ointment APPLY TOPICALLY 3 (THREE) TIMES A DAY, Starting Mon 2/24/2025, Normal      triamcinolone (KENALOG) 0.1 % cream APPLY TOPICALLY 2 (TWO) TIMES A DAY, Starting Mon 3/17/2025, Normal      benzonatate (TESSALON PERLES) 100 mg capsule Take 1 capsule (100 mg total) by mouth 3 (three) times a day as needed for cough, Starting Mon 1/22/2024, Normal           No discharge procedures on file.  ED SEPSIS DOCUMENTATION   Time reflects when diagnosis was documented in both MDM as applicable and the Disposition within this note       Time User Action Codes Description Comment    7/24/2025  5:41 PM Markle, Brooke M Add [R41.3] Memory loss     7/24/2025  8:20 PM Mindy Hebert Add [N64.4] Breast pain, left                    [1]   Past Medical History:  Diagnosis Date    Anxiety     Arthritis     Breast cancer (HCC)     1994    Dementia (HCC)     Depression     Disease of thyroid gland     Diverticulitis of colon     History of chemotherapy     1994    Hypertension     Urinary incontinence    [2]   Past Surgical History:  Procedure Laterality Date    BREAST LUMPECTOMY Left     1994    BREAST SURGERY      COLON SURGERY      KNEE SURGERY     [3]   Family History  Problem Relation Name Age of Onset    Dementia Mother      Breast cancer Neg Hx      Colon cancer Neg Hx      Endometrial cancer Neg Hx      Ovarian cancer Neg Hx     [4]   Social History  Tobacco Use    Smoking status: Never     Passive exposure: Never    Smokeless tobacco: Never   Vaping Use    Vaping status: Never Used   Substance Use Topics    Alcohol use: Never    Drug use: Never        Mindy Hebert,   07/31/25 1926

## 2025-08-01 ENCOUNTER — OFFICE VISIT (OUTPATIENT)
Dept: INTERNAL MEDICINE CLINIC | Facility: CLINIC | Age: 80
End: 2025-08-01
Payer: COMMERCIAL

## 2025-08-01 VITALS
OXYGEN SATURATION: 99 % | DIASTOLIC BLOOD PRESSURE: 72 MMHG | TEMPERATURE: 97.7 F | HEART RATE: 67 BPM | HEIGHT: 59 IN | WEIGHT: 133 LBS | BODY MASS INDEX: 26.81 KG/M2 | SYSTOLIC BLOOD PRESSURE: 128 MMHG

## 2025-08-01 DIAGNOSIS — R42 DIZZINESS: Primary | ICD-10-CM

## 2025-08-01 DIAGNOSIS — I10 BENIGN HYPERTENSION: ICD-10-CM

## 2025-08-01 DIAGNOSIS — M12.812 ROTATOR CUFF ARTHROPATHY OF BOTH SHOULDERS: ICD-10-CM

## 2025-08-01 DIAGNOSIS — G30.0 MILD EARLY ONSET ALZHEIMER'S DEMENTIA WITHOUT BEHAVIORAL DISTURBANCE, PSYCHOTIC DISTURBANCE, MOOD DISTURBANCE, OR ANXIETY (HCC): ICD-10-CM

## 2025-08-01 DIAGNOSIS — F34.1 DYSTHYMIC DISORDER: ICD-10-CM

## 2025-08-01 DIAGNOSIS — E78.2 MIXED HYPERLIPIDEMIA: ICD-10-CM

## 2025-08-01 DIAGNOSIS — M12.811 ROTATOR CUFF ARTHROPATHY OF BOTH SHOULDERS: ICD-10-CM

## 2025-08-01 DIAGNOSIS — F02.A0 MILD EARLY ONSET ALZHEIMER'S DEMENTIA WITHOUT BEHAVIORAL DISTURBANCE, PSYCHOTIC DISTURBANCE, MOOD DISTURBANCE, OR ANXIETY (HCC): ICD-10-CM

## 2025-08-01 DIAGNOSIS — E03.9 ACQUIRED HYPOTHYROIDISM: ICD-10-CM

## 2025-08-01 PROCEDURE — 99214 OFFICE O/P EST MOD 30 MIN: CPT | Performed by: INTERNAL MEDICINE

## 2025-08-01 PROCEDURE — G2211 COMPLEX E/M VISIT ADD ON: HCPCS | Performed by: INTERNAL MEDICINE

## 2025-08-02 DIAGNOSIS — L30.9 DERMATITIS: ICD-10-CM

## 2025-08-06 RX ORDER — TRIAMCINOLONE ACETONIDE 1 MG/G
CREAM TOPICAL 2 TIMES DAILY
Qty: 80 G | Refills: 11 | Status: SHIPPED | OUTPATIENT
Start: 2025-08-06

## 2025-08-22 ENCOUNTER — HOSPITAL ENCOUNTER (OUTPATIENT)
Dept: MAMMOGRAPHY | Facility: CLINIC | Age: 80
Discharge: HOME | End: 2025-08-22
Attending: INTERNAL MEDICINE
Payer: COMMERCIAL

## 2025-08-22 ENCOUNTER — HOSPITAL ENCOUNTER (OUTPATIENT)
Dept: ULTRASOUND IMAGING | Facility: CLINIC | Age: 80
Discharge: HOME | End: 2025-08-22
Attending: INTERNAL MEDICINE
Payer: COMMERCIAL

## 2025-08-22 ENCOUNTER — HOSPITAL ENCOUNTER (OUTPATIENT)
Dept: MAMMOGRAPHY | Facility: CLINIC | Age: 80
Discharge: HOME | End: 2025-08-22
Payer: COMMERCIAL

## 2025-08-22 VITALS — HEART RATE: 62 BPM | DIASTOLIC BLOOD PRESSURE: 84 MMHG | SYSTOLIC BLOOD PRESSURE: 184 MMHG

## 2025-08-22 DIAGNOSIS — R92.8 ABNORMAL ULTRASOUND OF BREAST: ICD-10-CM

## 2025-08-22 DIAGNOSIS — R92.8 ABNORMAL MAMMOGRAM: ICD-10-CM

## 2025-08-22 DIAGNOSIS — Z98.890 STATUS POST BIOPSY: ICD-10-CM

## 2025-08-22 PROCEDURE — 76642 ULTRASOUND BREAST LIMITED: CPT

## 2025-08-22 PROCEDURE — A4648 IMPLANTABLE TISSUE MARKER: HCPCS

## 2025-08-22 PROCEDURE — 19083 BX BREAST 1ST LESION US IMAG: CPT

## 2025-08-22 PROCEDURE — 88305 TISSUE EXAM BY PATHOLOGIST: CPT | Performed by: PATHOLOGY

## 2025-08-22 PROCEDURE — 88360 TUMOR IMMUNOHISTOCHEM/MANUAL: CPT | Performed by: PATHOLOGY

## 2025-08-22 PROCEDURE — 77066 DX MAMMO INCL CAD BI: CPT

## 2025-08-22 RX ORDER — LIDOCAINE HYDROCHLORIDE 10 MG/ML
5 INJECTION, SOLUTION EPIDURAL; INFILTRATION; INTRACAUDAL; PERINEURAL ONCE
Status: COMPLETED | OUTPATIENT
Start: 2025-08-22 | End: 2025-08-22

## 2025-08-22 RX ADMIN — LIDOCAINE HYDROCHLORIDE 5 ML: 10 INJECTION, SOLUTION EPIDURAL; INFILTRATION; INTRACAUDAL; PERINEURAL at 10:08

## 2025-08-22 RX ADMIN — IOHEXOL 90 ML: 350 INJECTION, SOLUTION INTRAVENOUS at 09:00

## 2025-08-26 PROBLEM — C50.911 INVASIVE DUCTAL CARCINOMA OF RIGHT BREAST IN FEMALE (HCC): Status: ACTIVE | Noted: 2025-08-26
